# Patient Record
Sex: MALE | Race: WHITE | Employment: OTHER | URBAN - METROPOLITAN AREA
[De-identification: names, ages, dates, MRNs, and addresses within clinical notes are randomized per-mention and may not be internally consistent; named-entity substitution may affect disease eponyms.]

---

## 2018-12-05 ENCOUNTER — TRANSFERRED RECORDS (OUTPATIENT)
Dept: HEALTH INFORMATION MANAGEMENT | Facility: CLINIC | Age: 83
End: 2018-12-05

## 2018-12-10 ENCOUNTER — TRANSFERRED RECORDS (OUTPATIENT)
Dept: HEALTH INFORMATION MANAGEMENT | Facility: CLINIC | Age: 83
End: 2018-12-10

## 2018-12-13 ENCOUNTER — TELEPHONE (OUTPATIENT)
Dept: SURGERY | Facility: CLINIC | Age: 83
End: 2018-12-13

## 2018-12-13 ENCOUNTER — TRANSFERRED RECORDS (OUTPATIENT)
Dept: HEALTH INFORMATION MANAGEMENT | Facility: CLINIC | Age: 83
End: 2018-12-13

## 2018-12-13 NOTE — TELEPHONE ENCOUNTER
Request for records, images, pathology sent to [facility] 12/13/2018 4:12 PM     ONCOLOGY INTAKE: Records Information      APPT INFORMATION:  Referring provider:  Self, 2nd Opinion  Referring provider s clinic:  Seen only at [facility], per Daughter  Reason for visit/diagnosis:  Esophageal Cancer

## 2018-12-14 ENCOUNTER — TRANSFERRED RECORDS (OUTPATIENT)
Dept: HEALTH INFORMATION MANAGEMENT | Facility: CLINIC | Age: 83
End: 2018-12-14

## 2018-12-17 NOTE — PROGRESS NOTES
THORACIC SURGERY - NEW PATIENT OFFICE VISIT      Dear Dr. Gold,    I saw Mr. Jones at Dr. Sharma s request in consultation for the evaluation and treatment of an esophageal cancer.     HPI  Mr. Truong Jones is a 84 year old man who initially presented to the ER at [facility] on 12/17/18 with fatigue and difficulty swallowing. He had a thirty pound weight loss and difficulty swallowing for several months.  At baseline, he has difficulty chewing food due to loss of teeth.  He underwent an EGD on 12/10/18 that showed a partially obstruction mass with biopsies revealing a moderately differentiated invasive adenocarcinoma. A CT chest was performed the following day showing no obvious metastatic disease. An additional EGD/EUS on 12/13/18 that showed the mass at the GE junction. At that time, the esophagus was dilated, which improved his swallowing symptoms. He is a clinical stage T3N0, IIB. Since the dilation and diagnosis, the patient's family has been very involved and encouraging increased caloric intake.  His appetite was decreased after his wife's death.  He has gained several pounds even within the last week.  He is here with his daughter and granddaughter for a second opinion.  The current plan is for chemoradiation pending a PET scan in two days.      Today,  the patient feels improved overall. He has been eating a mechanical soft diet without difficulty. The dilation of esophagus during his last EGD improved his swallowing, though he is having some mild dysphagia.  Mr Jones continues to be active andwalks several times a day with a cane as baseline. He is able to climb two flights of stairs without stopping.  He smoked one pack per day for 30 years and quit 40 years ago.       Previsit Tests   EGD 12/10/18: partially obstructing, likely malignant esophageal tumor in lower third of esophagus, biopsies. Dilated to 12 mm.   Biopsies from EGD 12/10: moderately differentiated invasive  adenocarcinoma  CT chest/abd/pelvis 12/11/18: distal esophageal mass, no adenopathy or evidence of metastatic disease  Endoscopic ultrasound 12/13/18: partially obstructing friable mass encountered at GE junction, traversed with mild resistance. Mass was at least 4 cm in length and 2 cm in thickness sonographically with extensive invovlement beyond the esophageal wall. No evidence of malignant node or distant metastasis. Staged T3N0, IIB.      PMH  Type 2 diabetes mellitus - metformin   HTN - amlodipine, lisinopril, metoprolol   Polymyalgia rheumatica  Hx of prostate cancer  Depression  Thoracic aortic aneurysm    Past surgeries  Radical prostatectomy 6/5/2007    ETOH - no  TOB - 30 year smoking history, quit 40 years ago     Physical examination  BMI   No lymphadenopathy palpated during examination. No nodules are masses identified. Lungs clear to ausculation. Cardiac regular rate and rhythm.     From a personal perspective, the patient is here with his daughter and granddaughter, with whom he lives.  He is a .  He is a retired violinist.        IMPRESSION (C15.9) Esophageal cancer, stage IIB (H)  This is an 84 year old man with stage IIB esophageal adenocarcinoma.        PLAN  I spent a total of 60 minutes with Mr. Jones and his family, more than 50% of which were spent in counseling, coordination of care, and face-to-face time.   I had a long discussion with the patient and his family regarding management of this condition.  Presuming the PET scan shows no distant disease, I recommend resection 6-8 weeks after completing chemoradiation.  If the patient has difficuly swallowing, I discussed consideration of a feeding jejunostomy placement.    I discussed with the patient the conduct of the operation of a transhiatal esophagectomy. I explained that the hospital stay would likely be 5-10 days if there are no complications. I explained that approximately 50% of patients have some complication, ranging from  minor complications to the very serious ones. The recovery takes up to 8 weeks.  I explained to the patient the risks and benefits of the procedure. The benefit of the procedure is to remove the esophageal cancer. The risks include anastomotic leak, gastric necrosis, pneumonia, DVT and pulmonary embolism, arrhythmia, urinary tract infection, jejunostomy tube complications including intestinal obstruction, pyloroplasty leak and death.  They had all their questions answered and were in agreement with the plan.  I appreciate the opportunity to participate in the care of your patient and will keep you updated.  Sincerely,

## 2018-12-18 ENCOUNTER — OFFICE VISIT (OUTPATIENT)
Dept: SURGERY | Facility: CLINIC | Age: 83
End: 2018-12-18
Attending: THORACIC SURGERY (CARDIOTHORACIC VASCULAR SURGERY)
Payer: COMMERCIAL

## 2018-12-18 VITALS
DIASTOLIC BLOOD PRESSURE: 76 MMHG | OXYGEN SATURATION: 97 % | HEIGHT: 63 IN | SYSTOLIC BLOOD PRESSURE: 123 MMHG | BODY MASS INDEX: 25.2 KG/M2 | TEMPERATURE: 98.5 F | WEIGHT: 142.2 LBS | HEART RATE: 90 BPM

## 2018-12-18 DIAGNOSIS — C15.9: ICD-10-CM

## 2018-12-18 PROCEDURE — 99205 OFFICE O/P NEW HI 60 MIN: CPT | Mod: ZP | Performed by: THORACIC SURGERY (CARDIOTHORACIC VASCULAR SURGERY)

## 2018-12-18 PROCEDURE — 40000114 ZZH STATISTIC NO CHARGE CLINIC VISIT

## 2018-12-18 RX ORDER — ATORVASTATIN CALCIUM 20 MG/1
TABLET, FILM COATED ORAL
Status: ON HOLD | COMMUNITY
Start: 2018-09-11 | End: 2019-02-17

## 2018-12-18 RX ORDER — ESCITALOPRAM OXALATE 20 MG/1
10 TABLET ORAL DAILY
COMMUNITY
Start: 2018-12-07

## 2018-12-18 RX ORDER — AMPICILLIN TRIHYDRATE 500 MG
1000 CAPSULE ORAL
Status: ON HOLD | COMMUNITY
Start: 2013-07-24 | End: 2019-02-17

## 2018-12-18 RX ORDER — LISINOPRIL 20 MG/1
20 TABLET ORAL DAILY
COMMUNITY
Start: 2018-11-11

## 2018-12-18 RX ORDER — AMLODIPINE BESYLATE 2.5 MG/1
2.5 TABLET ORAL DAILY
COMMUNITY
Start: 2018-11-11

## 2018-12-18 RX ORDER — MULTIVITAMIN
TABLET ORAL
Status: ON HOLD | COMMUNITY
Start: 2004-08-25 | End: 2019-02-17

## 2018-12-18 RX ORDER — METOPROLOL SUCCINATE 25 MG/1
12.5 TABLET, EXTENDED RELEASE ORAL DAILY
Status: ON HOLD | COMMUNITY
Start: 2018-02-28 | End: 2019-02-17

## 2018-12-18 RX ORDER — DIPHENOXYLATE HCL/ATROPINE 2.5-.025MG
TABLET ORAL
Status: ON HOLD | COMMUNITY
Start: 2018-11-20 | End: 2019-02-17

## 2018-12-18 RX ORDER — FLUOCINONIDE 0.5 MG/G
OINTMENT TOPICAL
Status: ON HOLD | COMMUNITY
Start: 2016-07-08 | End: 2019-02-17

## 2018-12-18 SDOH — HEALTH STABILITY: MENTAL HEALTH: HOW OFTEN DO YOU HAVE A DRINK CONTAINING ALCOHOL?: NEVER

## 2018-12-18 ASSESSMENT — MIFFLIN-ST. JEOR: SCORE: 1226.88

## 2018-12-18 NOTE — LETTER
12/18/2018       RE: Truong Jones  [address]  [place] [address]     Dear Colleague,    Thank you for referring your patient, rTuong Jones, to the [facility]. Please see a copy of my visit note below.    THORACIC SURGERY - NEW PATIENT OFFICE VISIT      Dear Dr. Gold,    I saw Mr. Jones at Dr. Shamra s request in consultation for the evaluation and treatment of an esophageal cancer.     HPI  Mr. Truong Jones is a 84 year old man who initially presented to the ER at [facility] on 12/17/18 with fatigue and difficulty swallowing. He had a thirty pound weight loss and difficulty swallowing for several months.  At baseline, he has difficulty chewing food due to loss of teeth.  He underwent an EGD on 12/10/18 that showed a partially obstruction mass with biopsies revealing a moderately differentiated invasive adenocarcinoma. A CT chest was performed the following day showing no obvious metastatic disease. An additional EGD/EUS on 12/13/18 that showed the mass at the GE junction. At that time, the esophagus was dilated, which improved his swallowing symptoms. He is a clinical stage T3N0, IIB. Since the dilation and diagnosis, the patient's family has been very involved and encouraging increased caloric intake.  His appetite was decreased after his wife's death.  He has gained several pounds even within the last week.  He is here with his daughter and granddaughter for a second opinion.  The current plan is for chemoradiation pending a PET scan in two days.      Today,  the patient feels improved overall. He has been eating a mechanical soft diet without difficulty. The dilation of esophagus during his last EGD improved his swallowing, though he is having some mild dysphagia.  Mr Jones continues to be active andwalks several times a day with a cane as baseline. He is able to climb two flights of stairs without stopping.  He smoked one pack per day for 30 years  and quit 40 years ago.       Previsit Tests   EGD 12/10/18: partially obstructing, likely malignant esophageal tumor in lower third of esophagus, biopsies. Dilated to 12 mm.   Biopsies from EGD 12/10: moderately differentiated invasive adenocarcinoma  CT chest/abd/pelvis 12/11/18: distal esophageal mass, no adenopathy or evidence of metastatic disease  Endoscopic ultrasound 12/13/18: partially obstructing friable mass encountered at GE junction, traversed with mild resistance. Mass was at least 4 cm in length and 2 cm in thickness sonographically with extensive invovlement beyond the esophageal wall. No evidence of malignant node or distant metastasis. Staged T3N0, IIB.      PMH  Type 2 diabetes mellitus - metformin   HTN - amlodipine, lisinopril, metoprolol   Polymyalgia rheumatica  Hx of prostate cancer  Depression  Thoracic aortic aneurysm    Past surgeries  Radical prostatectomy 6/5/2007    ETOH - no  TOB - 30 year smoking history, quit 40 years ago     Physical examination  BMI   No lymphadenopathy palpated during examination. No nodules are masses identified. Lungs clear to ausculation. Cardiac regular rate and rhythm.     From a personal perspective, the patient is here with his daughter and granddaughter, with whom he lives.  He is a .  He is a retired violinist.        IMPRESSION (C15.9) Esophageal cancer, stage IIB (H)  This is an 84 year old man with stage IIB esophageal adenocarcinoma.        PLAN  I spent a total of 60 minutes with Mr. Jones and his family, more than 50% of which were spent in counseling, coordination of care, and face-to-face time.   I had a long discussion with the patient and his family regarding management of this condition.  Presuming the PET scan shows no distant disease, I recommend resection 6-8 weeks after completing chemoradiation.  If the patient has difficuly swallowing, I discussed consideration of a feeding jejunostomy placement.    I discussed with the patient  the conduct of the operation of a transhiatal esophagectomy. I explained that the hospital stay would likely be 5-10 days if there are no complications. I explained that approximately 50% of patients have some complication, ranging from minor complications to the very serious ones. The recovery takes up to 8 weeks.  I explained to the patient the risks and benefits of the procedure. The benefit of the procedure is to remove the esophageal cancer. The risks include anastomotic leak, gastric necrosis, pneumonia, DVT and pulmonary embolism, arrhythmia, urinary tract infection, jejunostomy tube complications including intestinal obstruction, pyloroplasty leak and death.  They had all their questions answered and were in agreement with the plan.  I appreciate the opportunity to participate in the care of your patient and will keep you updated.    Sincerely,    Again, thank you for allowing me to participate in the care of your patient.      Sincerely,    Jaime Szymanski MD

## 2018-12-19 PROCEDURE — 00000346 ZZHCL STATISTIC REVIEW OUTSIDE SLIDES TC 88321: Performed by: THORACIC SURGERY (CARDIOTHORACIC VASCULAR SURGERY)

## 2018-12-20 LAB — COPATH REPORT: NORMAL

## 2018-12-21 ENCOUNTER — TELEPHONE (OUTPATIENT)
Dept: SURGERY | Facility: CLINIC | Age: 83
End: 2018-12-21

## 2018-12-21 NOTE — TELEPHONE ENCOUNTER
Antonio was called to see if he has decided if he is having treatment at [facility] or the [facility].  He has elected to have chemo/rads at [facility] and will begin after the New Year.  He would like to have surgery if need with Dr. Szymanski after treatment.    I informed him that we will touch base with him in a month or so to arrange a f/u apt with Dr. Szymanski.

## 2018-12-25 PROBLEM — M35.3 POLYMYALGIA RHEUMATICA (H): Status: ACTIVE | Noted: 2018-12-25

## 2018-12-25 PROBLEM — I71.20 THORACIC AORTIC ANEURYSM WITHOUT RUPTURE (H): Status: ACTIVE | Noted: 2018-12-25

## 2018-12-25 PROBLEM — Z85.46 PERSONAL HISTORY OF PROSTATE CANCER: Status: ACTIVE | Noted: 2018-12-25

## 2018-12-25 PROBLEM — I10 ESSENTIAL HYPERTENSION: Status: ACTIVE | Noted: 2018-12-25

## 2018-12-25 PROBLEM — F32.A DEPRESSION: Status: ACTIVE | Noted: 2018-12-25

## 2018-12-25 PROBLEM — C15.9: Status: ACTIVE | Noted: 2018-12-25

## 2018-12-25 PROBLEM — E11.9 TYPE 2 DIABETES MELLITUS (H): Status: ACTIVE | Noted: 2018-12-25

## 2019-01-23 ENCOUNTER — TELEPHONE (OUTPATIENT)
Dept: SURGERY | Facility: CLINIC | Age: 84
End: 2019-01-23

## 2019-01-24 NOTE — TELEPHONE ENCOUNTER
Received a message from Rebel(radiation nurse from [facility])stating that pt is in need of a feeding tube.  He is scheduled for a GJ tube at [facility] and she was concerned that Dr. Szymanski would not want that done. Pt possibly will need esophageal resection post C/R   I spoke with Dr. Ryder and he thought Dr. Szymanski would want to place a Jtube.  We will discuss with Dr. Szymanski tomorrow  I returned a call to [facility] but no one was available after 4pm.  Will update them tomorrow

## 2019-01-25 ENCOUNTER — TELEPHONE (OUTPATIENT)
Dept: SURGERY | Facility: CLINIC | Age: 84
End: 2019-01-25

## 2019-01-25 ENCOUNTER — HOSPITAL ENCOUNTER (INPATIENT)
Facility: CLINIC | Age: 84
Setting detail: SURGERY ADMIT
End: 2019-01-25
Attending: THORACIC SURGERY (CARDIOTHORACIC VASCULAR SURGERY) | Admitting: THORACIC SURGERY (CARDIOTHORACIC VASCULAR SURGERY)
Payer: COMMERCIAL

## 2019-01-25 DIAGNOSIS — C15.9: Primary | ICD-10-CM

## 2019-01-25 RX ORDER — CEFAZOLIN SODIUM 2 G/50ML
2 SOLUTION INTRAVENOUS
Status: CANCELLED | OUTPATIENT
Start: 2019-01-25

## 2019-01-25 RX ORDER — CEFAZOLIN SODIUM 1 G/50ML
1 INJECTION, SOLUTION INTRAVENOUS SEE ADMIN INSTRUCTIONS
Status: CANCELLED | OUTPATIENT
Start: 2019-01-25

## 2019-01-25 NOTE — TELEPHONE ENCOUNTER
Spoke with patients daughter to schedule surgery with Dr Jaime Szymanski    Surgery was scheduled on 2/1 at [facility]    Patient will have Pre-Op with PCP on 1/28  -Patient advised H&P is needed or surgery cancellation may occur    Patient is aware a / is needed day of surgery.     Surgery packet was sent via mail, patient has my direct contact information for any further questions.     Justine Delarosa  Surgical [facility]onie-Op Coordinator  [phone]

## 2019-01-25 NOTE — TELEPHONE ENCOUNTER
Spoke with both Antonio and his daughter and discussed placement of Jtube.  Dr. Szymanski would like to have it placed next week.  They will work on getting a pre-op physical at [facility].  Our surgery scheduler will call them with date and time and instructions.    I also called and spoke with Rebel radiation nurse at [facility].  She was updated with information     Will try and get pt scheduled for [facility] center appt before surgery next week

## 2019-01-29 ENCOUNTER — TELEPHONE (OUTPATIENT)
Dept: SURGERY | Facility: CLINIC | Age: 84
End: 2019-01-29

## 2019-01-29 NOTE — TELEPHONE ENCOUNTER
I called daughter, Miryam, about her concerns about her father.  I had discussed this with Dr. Szymasnki who suggested we admit him to hydrate and try to move up J-tube placement as he is not able to eat/drink much.       She told me he had collapsed at home yesterday and they had him transported by ambulance to [facility] (he is receiving daily radiation treatments there).   He was admitted through the ED and she was awaiting a conversation with Dr. Quiros (internal medicine) who is rounding and expected by 4pm.   He had indicated to her earlier that he wanted to talk to Dr. Szymanski.   Patient is currently on [facility] South at [facility].  He can tolerate 2 Ensure's each day but otherwise takes little orally.       I gave her Dr. Szymanski's cell phone # and asked her to have Dr. Quiros call him today to discuss further.   She would like for him to come here for J-tube placement and then return to [facility] so he doesn't miss a radiation treatment.   I explained that we sometimes admit patients to assess tolerance to tube feeding formulas but would leave this up to the surgeon.

## 2019-01-29 NOTE — TELEPHONE ENCOUNTER
Patient's granddaughter called to see if this procedure can be done sooner than scheduled date 2/1.  His family feels he is failing and would benefit to have this moved up asa.  Please advise

## 2019-01-31 ENCOUNTER — ANESTHESIA EVENT (OUTPATIENT)
Dept: SURGERY | Facility: CLINIC | Age: 84
End: 2019-01-31

## 2019-02-01 ENCOUNTER — ANESTHESIA (OUTPATIENT)
Dept: SURGERY | Facility: CLINIC | Age: 84
End: 2019-02-01

## 2019-02-01 ENCOUNTER — TELEPHONE (OUTPATIENT)
Dept: SURGERY | Facility: CLINIC | Age: 84
End: 2019-02-01

## 2019-02-01 NOTE — TELEPHONE ENCOUNTER
Call from 3C pre-op nurse who had talked to patients' daughter; she said that Dr. Szymanski spoke with a physician from [facility] and decided that this procedure is not needed because the patient is taking in enough food orally.   I confirmed this with Dr. Szymanski and procedure has been cancelled.

## 2019-02-12 ENCOUNTER — TRANSFERRED RECORDS (OUTPATIENT)
Dept: HEALTH INFORMATION MANAGEMENT | Facility: CLINIC | Age: 84
End: 2019-02-12

## 2019-02-15 ENCOUNTER — HOSPITAL ENCOUNTER (OUTPATIENT)
Facility: CLINIC | Age: 84
End: 2019-02-15
Attending: THORACIC SURGERY (CARDIOTHORACIC VASCULAR SURGERY) | Admitting: THORACIC SURGERY (CARDIOTHORACIC VASCULAR SURGERY)
Payer: MEDICARE

## 2019-02-15 DIAGNOSIS — C15.9: Primary | ICD-10-CM

## 2019-02-15 LAB
CREAT SERPL-MCNC: 0.6 MG/DL (ref 0.73–1.18)
GFR SERPL CREATININE-BSD FRML MDRD: >60 ML/MIN/1.73M2
GLUCOSE SERPL-MCNC: 138 MG/DL (ref 70–180)
POTASSIUM SERPL-SCNC: 3.4 MMOL/L (ref 3.5–5.1)

## 2019-02-15 RX ORDER — CEFAZOLIN SODIUM 2 G/50ML
2 SOLUTION INTRAVENOUS
Status: CANCELLED | OUTPATIENT
Start: 2019-02-15

## 2019-02-15 RX ORDER — CEFAZOLIN SODIUM 1 G/50ML
1 INJECTION, SOLUTION INTRAVENOUS SEE ADMIN INSTRUCTIONS
Status: CANCELLED | OUTPATIENT
Start: 2019-02-15

## 2019-02-16 ENCOUNTER — APPOINTMENT (OUTPATIENT)
Dept: GENERAL RADIOLOGY | Facility: CLINIC | Age: 84
DRG: 374 | End: 2019-02-16
Attending: THORACIC SURGERY (CARDIOTHORACIC VASCULAR SURGERY)
Payer: MEDICARE

## 2019-02-16 ENCOUNTER — TRANSFERRED RECORDS (OUTPATIENT)
Dept: HEALTH INFORMATION MANAGEMENT | Facility: CLINIC | Age: 84
End: 2019-02-16

## 2019-02-16 ENCOUNTER — HOSPITAL ENCOUNTER (INPATIENT)
Facility: CLINIC | Age: 84
LOS: 6 days | Discharge: NURSING, REHAB OR LONG-TERM CARE | DRG: 374 | End: 2019-02-22
Attending: THORACIC SURGERY (CARDIOTHORACIC VASCULAR SURGERY) | Admitting: THORACIC SURGERY (CARDIOTHORACIC VASCULAR SURGERY)
Payer: MEDICARE

## 2019-02-16 DIAGNOSIS — G89.18 ACUTE POST-OPERATIVE PAIN: Primary | ICD-10-CM

## 2019-02-16 DIAGNOSIS — R62.51 FAILURE TO THRIVE (0-17): ICD-10-CM

## 2019-02-16 LAB
ALBUMIN SERPL-MCNC: 2.9 G/DL (ref 3.4–5)
ALP SERPL-CCNC: 76 U/L (ref 40–150)
ALT SERPL W P-5'-P-CCNC: 16 U/L (ref 0–70)
ANION GAP SERPL CALCULATED.3IONS-SCNC: 7 MMOL/L (ref 3–14)
AST SERPL W P-5'-P-CCNC: 22 U/L (ref 0–45)
BILIRUB SERPL-MCNC: 0.4 MG/DL (ref 0.2–1.3)
BUN SERPL-MCNC: 19 MG/DL (ref 7–30)
CALCIUM SERPL-MCNC: 8 MG/DL (ref 8.5–10.1)
CHLORIDE SERPL-SCNC: 106 MMOL/L (ref 94–109)
CO2 SERPL-SCNC: 24 MMOL/L (ref 20–32)
CREAT SERPL-MCNC: 0.59 MG/DL (ref 0.66–1.25)
ERYTHROCYTE [DISTWIDTH] IN BLOOD BY AUTOMATED COUNT: 17 % (ref 10–15)
GFR SERPL CREATININE-BSD FRML MDRD: >90 ML/MIN/{1.73_M2}
GLUCOSE BLDC GLUCOMTR-MCNC: 123 MG/DL (ref 70–99)
GLUCOSE SERPL-MCNC: 121 MG/DL (ref 70–99)
HBA1C MFR BLD: 6.2 % (ref 0–5.6)
HCT VFR BLD AUTO: 27.6 % (ref 40–53)
HGB BLD-MCNC: 9.3 G/DL (ref 13.3–17.7)
INR PPP: 1.17 (ref 0.86–1.14)
LACTATE BLD-SCNC: 1.1 MMOL/L (ref 0.7–2)
MAGNESIUM SERPL-MCNC: 2.2 MG/DL (ref 1.6–2.3)
MCH RBC QN AUTO: 31 PG (ref 26.5–33)
MCHC RBC AUTO-ENTMCNC: 33.7 G/DL (ref 31.5–36.5)
MCV RBC AUTO: 92 FL (ref 78–100)
PHOSPHATE SERPL-MCNC: 3 MG/DL (ref 2.5–4.5)
PLATELET # BLD AUTO: 54 10E9/L (ref 150–450)
POTASSIUM SERPL-SCNC: 3.3 MMOL/L (ref 3.4–5.3)
PROT SERPL-MCNC: 5.9 G/DL (ref 6.8–8.8)
RBC # BLD AUTO: 3 10E12/L (ref 4.4–5.9)
SODIUM SERPL-SCNC: 138 MMOL/L (ref 133–144)
WBC # BLD AUTO: 3.3 10E9/L (ref 4–11)

## 2019-02-16 PROCEDURE — 84100 ASSAY OF PHOSPHORUS: CPT | Performed by: STUDENT IN AN ORGANIZED HEALTH CARE EDUCATION/TRAINING PROGRAM

## 2019-02-16 PROCEDURE — 80053 COMPREHEN METABOLIC PANEL: CPT | Performed by: STUDENT IN AN ORGANIZED HEALTH CARE EDUCATION/TRAINING PROGRAM

## 2019-02-16 PROCEDURE — 85610 PROTHROMBIN TIME: CPT | Performed by: STUDENT IN AN ORGANIZED HEALTH CARE EDUCATION/TRAINING PROGRAM

## 2019-02-16 PROCEDURE — 3E0436Z INTRODUCTION OF NUTRITIONAL SUBSTANCE INTO CENTRAL VEIN, PERCUTANEOUS APPROACH: ICD-10-PCS | Performed by: THORACIC SURGERY (CARDIOTHORACIC VASCULAR SURGERY)

## 2019-02-16 PROCEDURE — 25000125 ZZHC RX 250

## 2019-02-16 PROCEDURE — 40000557 ZZH STATISTIC PORT-A-CATH ACCESS/FLUSHING

## 2019-02-16 PROCEDURE — 84134 ASSAY OF PREALBUMIN: CPT | Performed by: STUDENT IN AN ORGANIZED HEALTH CARE EDUCATION/TRAINING PROGRAM

## 2019-02-16 PROCEDURE — 12000001 ZZH R&B MED SURG/OB UMMC

## 2019-02-16 PROCEDURE — 40000141 ZZH STATISTIC PERIPHERAL IV START W/O US GUIDANCE

## 2019-02-16 PROCEDURE — 83735 ASSAY OF MAGNESIUM: CPT | Performed by: STUDENT IN AN ORGANIZED HEALTH CARE EDUCATION/TRAINING PROGRAM

## 2019-02-16 PROCEDURE — 00000146 ZZHCL STATISTIC GLUCOSE BY METER IP

## 2019-02-16 PROCEDURE — 71045 X-RAY EXAM CHEST 1 VIEW: CPT

## 2019-02-16 PROCEDURE — 25000128 H RX IP 250 OP 636: Performed by: STUDENT IN AN ORGANIZED HEALTH CARE EDUCATION/TRAINING PROGRAM

## 2019-02-16 PROCEDURE — 36415 COLL VENOUS BLD VENIPUNCTURE: CPT | Performed by: STUDENT IN AN ORGANIZED HEALTH CARE EDUCATION/TRAINING PROGRAM

## 2019-02-16 PROCEDURE — 83036 HEMOGLOBIN GLYCOSYLATED A1C: CPT | Performed by: STUDENT IN AN ORGANIZED HEALTH CARE EDUCATION/TRAINING PROGRAM

## 2019-02-16 PROCEDURE — 85027 COMPLETE CBC AUTOMATED: CPT | Performed by: STUDENT IN AN ORGANIZED HEALTH CARE EDUCATION/TRAINING PROGRAM

## 2019-02-16 PROCEDURE — 83605 ASSAY OF LACTIC ACID: CPT | Performed by: THORACIC SURGERY (CARDIOTHORACIC VASCULAR SURGERY)

## 2019-02-16 RX ORDER — HEPARIN SODIUM 5000 [USP'U]/.5ML
5000 INJECTION, SOLUTION INTRAVENOUS; SUBCUTANEOUS EVERY 8 HOURS
Status: DISCONTINUED | OUTPATIENT
Start: 2019-02-16 | End: 2019-02-17

## 2019-02-16 RX ORDER — PROCHLORPERAZINE MALEATE 5 MG
5 TABLET ORAL EVERY 6 HOURS PRN
Status: DISCONTINUED | OUTPATIENT
Start: 2019-02-16 | End: 2019-02-22 | Stop reason: HOSPADM

## 2019-02-16 RX ORDER — ONDANSETRON 4 MG/1
4 TABLET, ORALLY DISINTEGRATING ORAL EVERY 6 HOURS PRN
Status: DISCONTINUED | OUTPATIENT
Start: 2019-02-16 | End: 2019-02-22 | Stop reason: HOSPADM

## 2019-02-16 RX ORDER — OXYCODONE HYDROCHLORIDE 5 MG/1
5 TABLET ORAL
Status: DISCONTINUED | OUTPATIENT
Start: 2019-02-16 | End: 2019-02-18

## 2019-02-16 RX ORDER — NICOTINE POLACRILEX 4 MG
15-30 LOZENGE BUCCAL
Status: DISCONTINUED | OUTPATIENT
Start: 2019-02-16 | End: 2019-02-22 | Stop reason: HOSPADM

## 2019-02-16 RX ORDER — PROCHLORPERAZINE 25 MG
12.5 SUPPOSITORY, RECTAL RECTAL EVERY 12 HOURS PRN
Status: DISCONTINUED | OUTPATIENT
Start: 2019-02-16 | End: 2019-02-22 | Stop reason: HOSPADM

## 2019-02-16 RX ORDER — AMOXICILLIN 250 MG
1-2 CAPSULE ORAL 2 TIMES DAILY
Status: DISCONTINUED | OUTPATIENT
Start: 2019-02-16 | End: 2019-02-22 | Stop reason: HOSPADM

## 2019-02-16 RX ORDER — POLYETHYLENE GLYCOL 3350 17 G/17G
17 POWDER, FOR SOLUTION ORAL DAILY PRN
Status: DISCONTINUED | OUTPATIENT
Start: 2019-02-16 | End: 2019-02-17

## 2019-02-16 RX ORDER — DEXTROSE MONOHYDRATE 25 G/50ML
25-50 INJECTION, SOLUTION INTRAVENOUS
Status: DISCONTINUED | OUTPATIENT
Start: 2019-02-16 | End: 2019-02-22 | Stop reason: HOSPADM

## 2019-02-16 RX ORDER — ACETAMINOPHEN 325 MG/1
650 TABLET ORAL EVERY 4 HOURS PRN
Status: DISCONTINUED | OUTPATIENT
Start: 2019-02-16 | End: 2019-02-17

## 2019-02-16 RX ORDER — ONDANSETRON 2 MG/ML
4 INJECTION INTRAMUSCULAR; INTRAVENOUS EVERY 6 HOURS PRN
Status: DISCONTINUED | OUTPATIENT
Start: 2019-02-16 | End: 2019-02-22 | Stop reason: HOSPADM

## 2019-02-16 RX ORDER — CYCLOBENZAPRINE HCL 5 MG
5 TABLET ORAL 3 TIMES DAILY PRN
Status: DISCONTINUED | OUTPATIENT
Start: 2019-02-16 | End: 2019-02-22 | Stop reason: HOSPADM

## 2019-02-16 RX ORDER — NALOXONE HYDROCHLORIDE 0.4 MG/ML
.1-.4 INJECTION, SOLUTION INTRAMUSCULAR; INTRAVENOUS; SUBCUTANEOUS
Status: DISCONTINUED | OUTPATIENT
Start: 2019-02-16 | End: 2019-02-22 | Stop reason: HOSPADM

## 2019-02-16 RX ADMIN — I.V. FAT EMULSION 250 ML: 20 EMULSION INTRAVENOUS at 22:58

## 2019-02-16 RX ADMIN — Medication 5000 UNITS: at 22:05

## 2019-02-16 RX ADMIN — FAMOTIDINE 20 MG: 20 INJECTION, SOLUTION INTRAVENOUS at 22:59

## 2019-02-16 RX ADMIN — ASCORBIC ACID, VITAMIN A PALMITATE, CHOLECALCIFEROL, THIAMINE HYDROCHLORIDE, RIBOFLAVIN-5 PHOSPHATE SODIUM, PYRIDOXINE HYDROCHLORIDE, NIACINAMIDE, DEXPANTHENOL, ALPHA-TOCOPHEROL ACETATE, VITAMIN K1, FOLIC ACID, BIOTIN, CYANOCOBALAMIN: 200; 3300; 200; 6; 3.6; 6; 40; 15; 10; 150; 600; 60; 5 INJECTION, SOLUTION INTRAVENOUS at 22:59

## 2019-02-16 ASSESSMENT — ACTIVITIES OF DAILY LIVING (ADL): ADLS_ACUITY_SCORE: 18

## 2019-02-16 NOTE — LETTER
Transition Communication Hand-off for Care Transitions to Next Level of Care Provider    Name: Truong Jones  : 3/28/1934  MRN #: [id number]  Primary Care Provider: David Gold     Primary Clinic: [address]   [facility] [place] [address]     Reason for Hospitalization:  Adnocarcinoma  e  Failure to thrive (0-17)  Admit Date/Time: 2019  4:59 PM  Discharge Date: 19  Payor Source: Payor: [employer] / Plan: [employer] PLAN / Product Type: HMO /     Readmission Assessment Measure (MANSI) Risk Score/category: Average         Reason for Communication Hand-off Referral: Other D/c to TCU    Discharge Plan:      Patient Name:  Truong Jones     Anticipated Discharge Date:  19    Discharge Disposition:   TCU:  [facility]   [address] [place] [address]  P. [phone], F. [phone]     Concern for non-adherence with plan of care:   Y/N Unknown  Discharge Needs Assessment:  Needs      Most Recent Value   Equipment Currently Used at Home  cane, straight, cane, quad [pt reports using straight cane more frequently]          Already enrolled in Tele-monitoring program and name of program:  Unknown  Follow-up specialty is recommended: Unknown    Follow-up plan:  No future appointments.    Any outstanding tests or procedures:        Referrals     Future Labs/Procedures    Occupational Therapy Adult Consult     Comments:    Evaluate and treat as clinically indicated.    Reason:  S/p thoracic surgery, deconditioning    Physical Therapy Adult Consult     Comments:    Evaluate and treat as clinically indicated.    Reason:  S/p thoracic surgery, deconditioning            LIDIA Franklin, MSW  7B   [phone] (pager) [phone]  2019

## 2019-02-17 ENCOUNTER — ANESTHESIA EVENT (OUTPATIENT)
Dept: SURGERY | Facility: CLINIC | Age: 84
DRG: 374 | End: 2019-02-17
Payer: MEDICARE

## 2019-02-17 ENCOUNTER — APPOINTMENT (OUTPATIENT)
Dept: SPEECH THERAPY | Facility: CLINIC | Age: 84
DRG: 374 | End: 2019-02-17
Attending: THORACIC SURGERY (CARDIOTHORACIC VASCULAR SURGERY)
Payer: MEDICARE

## 2019-02-17 LAB
ALBUMIN SERPL-MCNC: 2.7 G/DL (ref 3.4–5)
ALP SERPL-CCNC: 70 U/L (ref 40–150)
ALT SERPL W P-5'-P-CCNC: 15 U/L (ref 0–70)
ANION GAP SERPL CALCULATED.3IONS-SCNC: 4 MMOL/L (ref 3–14)
AST SERPL W P-5'-P-CCNC: 20 U/L (ref 0–45)
BILIRUB DIRECT SERPL-MCNC: 0.1 MG/DL (ref 0–0.2)
BILIRUB SERPL-MCNC: 0.5 MG/DL (ref 0.2–1.3)
BUN SERPL-MCNC: 16 MG/DL (ref 7–30)
CALCIUM SERPL-MCNC: 7.9 MG/DL (ref 8.5–10.1)
CHLORIDE SERPL-SCNC: 107 MMOL/L (ref 94–109)
CO2 SERPL-SCNC: 27 MMOL/L (ref 20–32)
CREAT SERPL-MCNC: 0.61 MG/DL (ref 0.66–1.25)
GFR SERPL CREATININE-BSD FRML MDRD: >90 ML/MIN/{1.73_M2}
GLUCOSE BLDC GLUCOMTR-MCNC: 129 MG/DL (ref 70–99)
GLUCOSE BLDC GLUCOMTR-MCNC: 131 MG/DL (ref 70–99)
GLUCOSE BLDC GLUCOMTR-MCNC: 144 MG/DL (ref 70–99)
GLUCOSE BLDC GLUCOMTR-MCNC: 146 MG/DL (ref 70–99)
GLUCOSE BLDC GLUCOMTR-MCNC: 150 MG/DL (ref 70–99)
GLUCOSE BLDC GLUCOMTR-MCNC: 151 MG/DL (ref 70–99)
GLUCOSE BLDC GLUCOMTR-MCNC: 159 MG/DL (ref 70–99)
GLUCOSE SERPL-MCNC: 138 MG/DL (ref 70–99)
INR PPP: 1.19 (ref 0.86–1.14)
MAGNESIUM SERPL-MCNC: 2.1 MG/DL (ref 1.6–2.3)
PHOSPHATE SERPL-MCNC: 3.3 MG/DL (ref 2.5–4.5)
POTASSIUM SERPL-SCNC: 3.5 MMOL/L (ref 3.4–5.3)
PROT SERPL-MCNC: 5.4 G/DL (ref 6.8–8.8)
SODIUM SERPL-SCNC: 138 MMOL/L (ref 133–144)

## 2019-02-17 PROCEDURE — 12000001 ZZH R&B MED SURG/OB UMMC

## 2019-02-17 PROCEDURE — 25000132 ZZH RX MED GY IP 250 OP 250 PS 637: Mod: GY | Performed by: STUDENT IN AN ORGANIZED HEALTH CARE EDUCATION/TRAINING PROGRAM

## 2019-02-17 PROCEDURE — 25800030 ZZH RX IP 258 OP 636: Performed by: STUDENT IN AN ORGANIZED HEALTH CARE EDUCATION/TRAINING PROGRAM

## 2019-02-17 PROCEDURE — 25000125 ZZHC RX 250: Performed by: THORACIC SURGERY (CARDIOTHORACIC VASCULAR SURGERY)

## 2019-02-17 PROCEDURE — A9270 NON-COVERED ITEM OR SERVICE: HCPCS | Mod: GY | Performed by: STUDENT IN AN ORGANIZED HEALTH CARE EDUCATION/TRAINING PROGRAM

## 2019-02-17 PROCEDURE — 40000225 ZZH STATISTIC SLP WARD VISIT

## 2019-02-17 PROCEDURE — 82248 BILIRUBIN DIRECT: CPT | Performed by: THORACIC SURGERY (CARDIOTHORACIC VASCULAR SURGERY)

## 2019-02-17 PROCEDURE — 84100 ASSAY OF PHOSPHORUS: CPT | Performed by: THORACIC SURGERY (CARDIOTHORACIC VASCULAR SURGERY)

## 2019-02-17 PROCEDURE — 36415 COLL VENOUS BLD VENIPUNCTURE: CPT | Performed by: THORACIC SURGERY (CARDIOTHORACIC VASCULAR SURGERY)

## 2019-02-17 PROCEDURE — 25000131 ZZH RX MED GY IP 250 OP 636 PS 637: Mod: GY | Performed by: STUDENT IN AN ORGANIZED HEALTH CARE EDUCATION/TRAINING PROGRAM

## 2019-02-17 PROCEDURE — 85610 PROTHROMBIN TIME: CPT | Performed by: THORACIC SURGERY (CARDIOTHORACIC VASCULAR SURGERY)

## 2019-02-17 PROCEDURE — 80053 COMPREHEN METABOLIC PANEL: CPT | Performed by: THORACIC SURGERY (CARDIOTHORACIC VASCULAR SURGERY)

## 2019-02-17 PROCEDURE — 25000128 H RX IP 250 OP 636: Performed by: STUDENT IN AN ORGANIZED HEALTH CARE EDUCATION/TRAINING PROGRAM

## 2019-02-17 PROCEDURE — 92610 EVALUATE SWALLOWING FUNCTION: CPT | Mod: GN

## 2019-02-17 PROCEDURE — 83735 ASSAY OF MAGNESIUM: CPT | Performed by: THORACIC SURGERY (CARDIOTHORACIC VASCULAR SURGERY)

## 2019-02-17 PROCEDURE — 00000146 ZZHCL STATISTIC GLUCOSE BY METER IP

## 2019-02-17 RX ORDER — POLYETHYLENE GLYCOL 3350 17 G/17G
17 POWDER, FOR SOLUTION ORAL DAILY
COMMUNITY
Start: 2019-02-17

## 2019-02-17 RX ORDER — ACETAMINOPHEN 160 MG/5ML
327-650 LIQUID ORAL EVERY 6 HOURS PRN
COMMUNITY
Start: 2019-01-31

## 2019-02-17 RX ORDER — SENNA LEAF EXTRACT 176MG/5ML
5 SYRUP ORAL DAILY
COMMUNITY
Start: 2019-02-17

## 2019-02-17 RX ORDER — OXYCODONE HCL 5 MG/5 ML
5-7.5 SOLUTION, ORAL ORAL EVERY 4 HOURS PRN
Status: ON HOLD | COMMUNITY
Start: 2019-02-16 | End: 2019-02-22

## 2019-02-17 RX ORDER — POLYETHYLENE GLYCOL 3350 17 G/17G
17 POWDER, FOR SOLUTION ORAL DAILY
Status: DISCONTINUED | OUTPATIENT
Start: 2019-02-17 | End: 2019-02-17

## 2019-02-17 RX ORDER — POTASSIUM CHLORIDE 750 MG/1
20-40 TABLET, EXTENDED RELEASE ORAL
Status: DISCONTINUED | OUTPATIENT
Start: 2019-02-17 | End: 2019-02-22 | Stop reason: HOSPADM

## 2019-02-17 RX ORDER — POTASSIUM CL/LIDO/0.9 % NACL 10MEQ/0.1L
10 INTRAVENOUS SOLUTION, PIGGYBACK (ML) INTRAVENOUS
Status: DISCONTINUED | OUTPATIENT
Start: 2019-02-17 | End: 2019-02-22 | Stop reason: HOSPADM

## 2019-02-17 RX ORDER — POTASSIUM CHLORIDE 29.8 MG/ML
20 INJECTION INTRAVENOUS
Status: DISCONTINUED | OUTPATIENT
Start: 2019-02-17 | End: 2019-02-22 | Stop reason: HOSPADM

## 2019-02-17 RX ORDER — LANOLIN ALCOHOL/MO/W.PET/CERES
3 CREAM (GRAM) TOPICAL ONCE
Status: COMPLETED | OUTPATIENT
Start: 2019-02-17 | End: 2019-02-17

## 2019-02-17 RX ORDER — PROCHLORPERAZINE MALEATE 10 MG
10 TABLET ORAL EVERY 6 HOURS PRN
COMMUNITY
Start: 2019-01-04

## 2019-02-17 RX ORDER — SODIUM CHLORIDE, SODIUM LACTATE, POTASSIUM CHLORIDE, CALCIUM CHLORIDE 600; 310; 30; 20 MG/100ML; MG/100ML; MG/100ML; MG/100ML
INJECTION, SOLUTION INTRAVENOUS CONTINUOUS
Status: DISCONTINUED | OUTPATIENT
Start: 2019-02-17 | End: 2019-02-22 | Stop reason: HOSPADM

## 2019-02-17 RX ORDER — SUCRALFATE ORAL 1 G/10ML
1 SUSPENSION ORAL 3 TIMES DAILY
COMMUNITY
Start: 2019-01-31

## 2019-02-17 RX ORDER — POLYETHYLENE GLYCOL 3350 17 G/17G
17 POWDER, FOR SOLUTION ORAL DAILY
Status: DISCONTINUED | OUTPATIENT
Start: 2019-02-17 | End: 2019-02-18

## 2019-02-17 RX ORDER — POTASSIUM CHLORIDE 1.5 G/1.58G
20-40 POWDER, FOR SOLUTION ORAL
Status: DISCONTINUED | OUTPATIENT
Start: 2019-02-17 | End: 2019-02-22 | Stop reason: HOSPADM

## 2019-02-17 RX ORDER — DIPHENHYDRAMINE HYDROCHLORIDE AND LIDOCAINE HYDROCHLORIDE AND ALUMINUM HYDROXIDE AND MAGNESIUM HYDRO
5 KIT
COMMUNITY
Start: 2019-01-31

## 2019-02-17 RX ORDER — AMLODIPINE BESYLATE 2.5 MG/1
2.5 TABLET ORAL DAILY
Status: DISCONTINUED | OUTPATIENT
Start: 2019-02-17 | End: 2019-02-22 | Stop reason: HOSPADM

## 2019-02-17 RX ORDER — POTASSIUM CHLORIDE 7.45 MG/ML
10 INJECTION INTRAVENOUS
Status: DISCONTINUED | OUTPATIENT
Start: 2019-02-17 | End: 2019-02-22 | Stop reason: HOSPADM

## 2019-02-17 RX ORDER — ESCITALOPRAM OXALATE 10 MG/1
20 TABLET ORAL DAILY
Status: DISCONTINUED | OUTPATIENT
Start: 2019-02-17 | End: 2019-02-22 | Stop reason: HOSPADM

## 2019-02-17 RX ORDER — BACLOFEN 10 MG/1
10 TABLET ORAL 3 TIMES DAILY PRN
COMMUNITY
Start: 2019-02-16 | End: 2020-02-16

## 2019-02-17 RX ADMIN — ONDANSETRON 4 MG: 2 INJECTION INTRAMUSCULAR; INTRAVENOUS at 15:30

## 2019-02-17 RX ADMIN — ONDANSETRON 4 MG: 2 INJECTION INTRAMUSCULAR; INTRAVENOUS at 08:30

## 2019-02-17 RX ADMIN — Medication 3 MG: at 22:17

## 2019-02-17 RX ADMIN — SENNOSIDES AND DOCUSATE SODIUM 2 TABLET: 8.6; 5 TABLET ORAL at 08:32

## 2019-02-17 RX ADMIN — INSULIN ASPART 1 UNITS: 100 INJECTION, SOLUTION INTRAVENOUS; SUBCUTANEOUS at 20:33

## 2019-02-17 RX ADMIN — FAMOTIDINE 20 MG: 20 INJECTION, SOLUTION INTRAVENOUS at 20:34

## 2019-02-17 RX ADMIN — POLYETHYLENE GLYCOL 3350 17 G: 17 POWDER, FOR SOLUTION ORAL at 13:05

## 2019-02-17 RX ADMIN — AMLODIPINE BESYLATE 2.5 MG: 2.5 TABLET ORAL at 15:22

## 2019-02-17 RX ADMIN — FAMOTIDINE 20 MG: 20 INJECTION, SOLUTION INTRAVENOUS at 08:22

## 2019-02-17 RX ADMIN — INSULIN ASPART 1 UNITS: 100 INJECTION, SOLUTION INTRAVENOUS; SUBCUTANEOUS at 16:00

## 2019-02-17 RX ADMIN — SENNOSIDES AND DOCUSATE SODIUM 1 TABLET: 8.6; 5 TABLET ORAL at 20:36

## 2019-02-17 RX ADMIN — INSULIN ASPART 1 UNITS: 100 INJECTION, SOLUTION INTRAVENOUS; SUBCUTANEOUS at 23:15

## 2019-02-17 RX ADMIN — INSULIN ASPART 1 UNITS: 100 INJECTION, SOLUTION INTRAVENOUS; SUBCUTANEOUS at 13:09

## 2019-02-17 RX ADMIN — INSULIN ASPART 1 UNITS: 100 INJECTION, SOLUTION INTRAVENOUS; SUBCUTANEOUS at 09:54

## 2019-02-17 RX ADMIN — SODIUM CHLORIDE, POTASSIUM CHLORIDE, SODIUM LACTATE AND CALCIUM CHLORIDE: 600; 310; 30; 20 INJECTION, SOLUTION INTRAVENOUS at 13:05

## 2019-02-17 RX ADMIN — Medication 5000 UNITS: at 06:12

## 2019-02-17 RX ADMIN — ESCITALOPRAM OXALATE 20 MG: 10 TABLET ORAL at 15:22

## 2019-02-17 RX ADMIN — ACETAMINOPHEN 650 MG: 325 SOLUTION ORAL at 18:50

## 2019-02-17 RX ADMIN — POTASSIUM CHLORIDE: 2 INJECTION, SOLUTION, CONCENTRATE INTRAVENOUS at 20:36

## 2019-02-17 RX ADMIN — PROCHLORPERAZINE EDISYLATE 5 MG: 5 INJECTION INTRAMUSCULAR; INTRAVENOUS at 10:13

## 2019-02-17 ASSESSMENT — ACTIVITIES OF DAILY LIVING (ADL)
ADLS_ACUITY_SCORE: 18

## 2019-02-17 NOTE — PLAN OF CARE
/78   Pulse 103   Temp 96.1  F (35.6  C) (Oral)   Resp 16   SpO2 96%        Pt arrived via EMS from [facility] at approxi. 1700. Pt AOx4, failure to thrive due to esophageal cancer/fainiting spells at home. Intermittent nausea/pain, decrease appetite. Still undergoing radiation at [facility] per pt and family   According to patient family pt was transitioned due to surgery for tube feeding insertion. Has a right chest port, right PIV. SBA, commode at bedside if needed. Doctor came in to evaluate pt. No orders yet.

## 2019-02-17 NOTE — PROGRESS NOTES
CLINICAL NUTRITION SERVICES - ASSESSMENT NOTE     Nutrition Prescription    RECOMMENDATIONS FOR MDs/PROVIDERS TO ORDER:  1. Consider endocrinology consult if BG is difficult to manage.     2. Monitor K, Mg, Phos and replace as needed. Pt is at low risk of refeeding syndrome given TPN started at previous hospital and pt currently on peripheral PN.     3. If enteral nutrition becomes POC once FT placed, please consult RD to assess and order TF.     Malnutrition Status:    Severe malnutrition in the context of acute on chronic illness    Recommendations already ordered by Registered Dietitian (RD):  --Use dosing weight 64 kg  --Tonight (2/17/19) at 8pm, discontinue peripheral PN and begin TPN via central line, goal volume 1440 ml/day with initial 140 g Dex daily (476 kcal, GIR 1.5), 80 g AA daily (320 kcal), and 250 ml 20% IV lipids 5x/wk (M-F).  Micro/Rx: Infuvite and MTE-5 or per PharmD discretion  --ONLY once pt receives ~100% of initial continuous PN volume with K+/Mg++/Phos WNL, advance PN dex by 20 g every 1-2 days (pending lytes/Glu and Pharm D/RD discretion) to initial goal of 180 g Dex (612 kcal) to increase provisions to 1289 kcals (20 kcal/kg/day), 1.3 g PRO/kg/day, GIR 1.95 with 28% kcals from Fat.    Future/Additional Recommendations:  -Monitor TPN tolerance/advancement to goal.  -Monitor TG level (was 221 H on 2/13/19 and hx of elevated TG per Care Everywhere) and adjust dextrose/lipid provision if needed.   -Monitor FT placement and transition to EN:  Isosource 1.5 @ 10 mL/hr q8h pending tolerance to goal 50 ml/hr (1200 ml/day) to provide 1800 kcals (28 kcal/kg/day), 82 g PRO (1.3 g/kg/day), 912 ml free H2O, 211 g CHO and 18 g Fiber daily.       REASON FOR ASSESSMENT  Truong Jones is a/an 84 year old male assessed by the dietitian for Admission Nutrition Risk Screen for reduced oral intake over the last month and Pharmacy/Nutrition to Start and Manage PN    NUTRITION HISTORY  PMH/Reason for  "Admission: T2DM, HTN, distal esophageal adenocarcinoma s/p radiation and chemotherapy, with subsequent difficulty with oral intake and epigastric pain, resulting in recent dehydration and failure to thrive.     Nutrition Hx:   -Per H&P, \"TPN was initiated and patient was transferred to [facility] on 2/16/19 for further management and feeding tube placement...He had previously been scheduled for J tube placement on 2/1/19, however it was cancelled because patient had been taking in enough food orally.\" -Pt reports last significant PO was part of a Boost drink a few days ago at [facility]. He reports difficulty swallowing over past couple weeks that has prevented him from eating/drinking much at all. He has also experienced intermittent N/V symptoms. Prior to this, he reports he was eating well.     CURRENT NUTRITION ORDERS  Diet: NPO  Intake/Tolerance: Not tolerating per chart review.    LABS  Labs reviewed  -K 3.5 (WNL)  -Mg 2.1 (WNL)  -Phos 3.3 (WNL)  -LFTs WNL  -Glucose 120s-150s since admission  - (H) on 2/13/19 per [facility] records in Care Everywhere    MEDICATIONS  Medications reviewed  -Clinimix E with Lipids @ 55 mL/hr - provides 949 kcal/day (15 kcal/kg) and 56 g protein (0.9 g PRO/kg) daily, which does not meet minimum estimated needs  -Senna-docusate  -Insulin (Novolog)    ANTHROPOMETRICS  Height: 0 cm (Data Unavailable)  Ht Readings from Last 2 Encounters:   12/18/18 1.595 m (5' 2.8\")   Most Recent Weight: 64 kg (141 lb)    IBW: 56.4 kg (113% IBW)  BMI: 25.14 kg/m2; Overweight BMI 25-29.9  Weight History: Wt fairly stable over past couple months based on wt hx below. However, pt reports a 30 pound weight loss over the last 6 months (~18% wt loss).  Wt Readings from Last 10 Encounters:   02/16/19 64 kg (141 lb)   12/18/18 64.5 kg (142 lb 3.2 oz)   Dosing Weight: 64 kg (actual, based on admission wt of 64 kg on 2/16/19)    ASSESSED NUTRITION NEEDS  Estimated Energy Needs: 9916-5150 kcals/day (20 " - 25 kcals/kg) with TPN or 5773-9230 kcals/day (25-30 kcal/kg) with PO/EN  Justification: Maintenance  Estimated Protein Needs: 77-96 grams protein/day (1.2 - 1.5 grams of pro/kg)  Justification: Hypercatabolism with acute illness  Estimated Fluid Needs: 1 mL/kcal   Justification: Maintenance    PHYSICAL FINDINGS  See malnutrition section below.    MALNUTRITION  (Based on Upper Body assessment and visualization as pt was nauseated and vomiting during RD visit)  % Intake: Decreased intake does not meet criteria; on TPN at [facility] and currently on PPN  % Weight Loss: > 10% in 6 months (severe); 18% per pt report  Subcutaneous Fat Loss: Facial region, Lower arm and Thoracic/intercostal: Mild  Muscle Loss: Temporal, Lower arm  (forearm) and Dorsal hand: Mild-Moderate (pt is unsure if he has noticed muscle loss)  Fluid Accumulation/Edema: Does not meet criteria (1+ trace bipedal edema per RN flowsheet)  Malnutrition Diagnosis: Severe malnutrition in the context of acute on chronic illness    NUTRITION DIAGNOSIS  Inadequate protein-energy intake related to difficulty swallowing and nausea/vomiting over past couple weeks and inadequate PN provision as evidenced by pt report of minimal intake over past couple weeks d/t these symptoms, wt loss of 30 lbs over past 6 months, pt currently on PPN that only meets ~70% of minimum estimated kcal and protein needs and need for TPN to meet nutrition needs at this time.     INTERVENTIONS  Implementation  -Nutrition Education: Provided education on RD role and TPN.   -Parenteral Nutrition/IV Fluids - Initiate   -Collaboration with other providers - Discussed recs with PharmD.     Goals  Total avg nutritional intake to meet a minimum of 20 kcal/kg with TPN or 25 kcal/kg with EN/PO and 1.2 g PRO/kg daily (per dosing wt 64 kg).     Monitoring/Evaluation  Progress toward goals will be monitored and evaluated per protocol.    Marguerite Nguyen RD, LD  Weekend Pager: [phone]

## 2019-02-17 NOTE — H&P
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 Patient was initially admitted to [facility] on 2/12/19 for dehydration and failure to thrive. TPN was initiated and patient was transferred to [facility] on 2/16/19 for further management and feeding tube placement since Dr. Szymanski is planning on doing his surgery eventually. He had previously been scheduled for J tube placement on 2/1/19, however it was cancelled because patient had been taking in enough food orally.    Now, patient reports that it has been quite difficult to take anything by mouth. He is able to take small pills or pills if they are crushed. This is his second hospital admission for dehydration, with his first in January. He was given Carafate and magic mouthwash, which helped for a while, however he recently has not found them to be helpful. Patient reports a 30 pound weight loss over the last 6 months. He has been having hiccups. Patient denies chest pain, SOB, fever/chills, nausea/vomiting, dysuria. Patient's daughter and granddaughter are at bedside. They expressed some concerns about patient falling, They state that he last fell 2 years ago and he broke his wrist.     REVIEW OF SYSTEMS: The remainder of the complete ROS was negative unless noted in the HPI.    PAST MEDICAL HISTORY:   Past Medical History:   Diagnosis Date     Personal history of prostate cancer 12/25/2018                       Polymyalgia rheumatica (H)  Colitis  Depression  Cataracts bilaterally  Arthritis  HLD 12/25/2018       PAST  SURGICAL HISTORY:   - Prostatectomy 2007  - R knee surgery  - R rotator cuff surgery  - Tonsillectomy as a kid  - blepharoplasty bilateral upper eyelids, bilateral lower eyelids    ALLERGIES:    Allergies   Allergen Reactions     Morphine      B/p dropped       HOME MEDICATIONS:     No current facility-administered medications on file prior to encounter.   Current Outpatient Medications on File Prior to Encounter:  amLODIPine (NORVASC) 2.5 MG tablet    atorvastatin (LIPITOR) 20 MG tablet    blood glucose monitoring (ACCU-CHEK RABIA PLUS) test strip USE AS DIRECTED TWICE DAILY   Calcium Citrate-Vitamin D (CITRACAL + D) 250-200 MG-UNIT TABS Take 1 tablet by mouth   Cholecalciferol (D 1000) 1000 units CAPS Take 1,000 Units by mouth   diphenoxylate-atropine (LOMOTIL) 2.5-0.025 MG tablet    escitalopram (LEXAPRO) 20 MG tablet Take 20 mg by mouth   fluocinonide (LIDEX) 0.05 % external ointment Apply to itchy hand rash 2-3 x day   lisinopril (PRINIVIL/ZESTRIL) 20 MG tablet    metFORMIN (GLUCOPHAGE) 1000 MG tablet TAKE ONE TABLET BY MOUTH TWICE A DAY   metoprolol succinate ER (TOPROL-XL) 25 MG 24 hr tablet Take 12.5 mg by mouth daily   Multiple vitamin TABS    Multiple Vitamins-Minerals (OCUVITE PRESERVISION PO)    omeprazole (PRILOSEC) 20 MG DR capsule        SOCIAL HISTORY:   Social History     Tobacco Use     Smoking status: Former Smoker     Types: Cigarettes, Cigars     Start date:      Last attempt to quit: 1978     Years since quittin.1     Smokeless tobacco: Never Used   Substance Use Topics     Alcohol use: No     Frequency: No use for > 30 years     Drug use: No       FAMILY HISTORY:  - T2DM- brother  -   PHYSICAL EXAMINATION:  Temp:  [96.1  F (35.6  C)-97.9  F (36.6  C)] 97.9  F (36.6  C)  Pulse:  [] 70  Resp:  [16] 16  BP: (158-162)/(78) 162/78  SpO2:  [96 %-97 %] 97 %   No intake/output data recorded.    General: awake, alert, oriented, NAD, lying in bed, appears comfortable  CV: RRR, no  murmurs, gallops, or rubs  Pulm: CTAB, breathing comfortably on room air  Abd: soft, non-distended, tender to palpation near epigastric area and to left of eipgastrum  : No jane  Extremities: warm, well-perfused with mild pitting edema of bilateral lower extremities up to mid shin  Neuro: No focal deficits noted, patient moves all extremities spontaneously, gait steady with assistance    LABS:  Recent Labs   Lab 02/16/19 1925   WBC 3.3*   RBC 3.00*   HGB 9.3*   HCT 27.6*   MCV 92   MCH 31.0   MCHC 33.7   RDW 17.0*   PLT 54*       Recent Labs   Lab 02/16/19 1925      POTASSIUM 3.3*   CHLORIDE 106   CO2 24   BUN 19   CR 0.59*   *   JAZMINE 8.0*   MAG 2.2   PHOS 3.0       Recent Labs   Lab 02/16/19 1925   AST 22   ALT 16   ALKPHOS 76   BILITOTAL 0.4   ALBUMIN 2.9*   INR 1.17*       ASSESSMENT: Truong Jones is a 84 year old male with 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 Patient was initially admitted to [facility] on 2/12/19 for dehydration and failure to thrive. TPN was initiated and patient was transferred to [facility] on 2/16/19 for further management and feeding tube placement since Dr. Szymanski is planning on doing his surgery eventually.    PLAN:    - Admit to thoracic surgery under Dr. Szymanski  - Feeding tube placement scheduled for Monday morning at 0730  - CLD as tolerated, mostly for comfort  - TPN via port  - sliding scale insulin   - Pain Management with Tylenol and oxycodone, muscle relaxant  - Nausea Management: zofran, compazine PRN  - Bowel Regimen: senna, miralax  - Labs now: CBC, CMP, Mg, Phos, INR, Alb, Prealbumin  - Ppx: subcutaneous heparin, PPI    Patient findings and plan discussed with Dr. Hercules, Thoracic Fellow, who will discuss with staff.    Christina Domingo MD (PGY1)  General Surgery Resident  Surgery Cross Cover

## 2019-02-17 NOTE — PLAN OF CARE
Patient denies SOb, clear LS, VSS on RA. Lactic triggered with a result of 1.1,  +BS, soft non tender abdomen. Refused Senna. Denies nausea. BG Q 4- 123, 131, 129. Continuous TPN started at 55 ml/hr with Lipids running via right chest port at 20.8, new PIV SL. Voiding large clear urine output. SBA. Uses call light appropriately. Continue poc.   Vitals:    02/16/19 2005 02/16/19 2045 02/16/19 2234 02/17/19 0407   BP: 162/78  164/78 142/72   BP Location:    Right arm   Pulse: 70  64 75   Resp: 16  16 16   Temp: 97.9  F (36.6  C)  99.3  F (37.4  C) 97.4  F (36.3  C)   TempSrc: Oral  Oral Oral   SpO2: 97%  96% 97%   Weight:  64 kg (141 lb)

## 2019-02-17 NOTE — PROGRESS NOTES
"   02/17/19 1200   General Information   Onset Date 02/16/19   Start of Care Date 02/10/19   Referring Physician Sun Laird MD   Patient Profile Review/OT: Additional Occupational Profile Info See Profile for full history and prior level of function   Patient/Family Goals Statement \"to eat prime rib\"   Swallowing Evaluation Bedside swallow evaluation   Behaviorial Observations WNL (within normal limits);Alert   Mode of current nutrition Oral diet   Type of oral diet Thin liquid  (clear liquid diet. 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    Clinical Swallow Evaluation   Oral Musculature generally intact   Structural Abnormalities none present   Dentition upper and lower dentures   Secretion Management (WFL)   Mucosal Quality adequate   Mandibular Strength and Mobility intact   Oral Labial Strength and Mobility WFL   Lingual Strength and Mobility WFL   Buccal Strength and Mobility intact   Laryngeal Function Cough;Throat clear;Swallow;Voicing initiated;Dry swallow palpated   Oral Musculature Comments WNL   Clinical Swallow Eval: Thin Liquid Texture Trial   Mode of Presentation, Thin Liquids cup;self-fed   Volume of Liquid or Food Presented 3 oz thin H20   Oral Phase of Swallow WFL   Pharyngeal Phase of Swallow intact   Diagnostic Statement WNL oropharyngeal swallow. Vomitting noted approx 1 min after the swallow, appeared to be all of H20 consumed.   Clinical Swallow Eval: Puree Solid Texture Trial   Mode of Presentation, Puree spoon;self-fed   Volume of Puree Presented 2 oz puree applesauce   Oral Phase, Puree WFL   Pharyngeal Phase, Puree intact   Diagnostic Statement WNL oropharyngeal swallow. Vomitting noted approx 1 min after the swallow, appeared to vomit 100% of puree trial consumed. 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   Diet texture recommendations (defer to MD due to esophageal dysphagia)   Recommended Feeding/Eating Techniques alternate between small bites and sips of food/liquid;maintain upright posture during/after eating for 30 mins;small sips/bites   Demonstrates Need for Referral to Another Service occupational therapy;physical therapy   Therapy Frequency (evaluation only)   Anticipated Discharge Disposition (no ongoing SLP needs. Defer to MD.)   Risks and Benefits of Treatment have been explained. Yes   Patient, family and/or staff in agreement with Plan of Care Yes   Clinical Impression Comments Clinical dysphagia examination completed per MD order. The patient is pleasant, cooperative and alert/oriented. He presents with intact oral mechanism (is edentulous, has upper & lower dentures). He fed himself small sips and bites of thin H20 and puree applesauce with intact oropharyngeal mechanism. However, approx 1 min after the swallow he experienced discomfort and significant nausea/vomiting (despite having just taken anti nausea medication). No diet modification is necessary from an oropharyngeal swallow perspective. However, the patient is at risk for aspiration of vomit after the swallow, dehydration and inadequate nutrition. SLP will sign off as oropharyngeal swallow is intact. Agree with plan for TF and nutrition consult.    Total  Evaluation Time   Total Evaluation Time (Minutes) 17

## 2019-02-17 NOTE — PLAN OF CARE
Discharge Planner SLP   Patient plan for discharge: Not stated  Current status: Clinical dysphagia examination completed per MD order. The patient is pleasant, cooperative and alert/oriented. He presents with intact oral mechanism (is edentulous, has upper & lower dentures). He fed himself small sips and bites of thin H20 and puree applesauce with intact oropharyngeal mechanism. However, approx 1 min after the swallow he experienced discomfort and significant nausea/vomiting (despite having just taken anti nausea medication). No diet modification is necessary from an oropharyngeal swallow perspective. However, the patient is at risk for aspiration of vomit after the swallow, dehydration and inadequate nutrition. SLP will sign off as oropharyngeal swallow is intact. Agree with plan for TF and nutrition consult.   Barriers to return to prior living situation: inadequate nutrition and PO intake  Recommendations for discharge: defer to MD  Rationale for recommendations: no ongoing SLP tx indicated at this time       Entered by: Gracy Munguia 02/17/2019 12:11 PM

## 2019-02-17 NOTE — PROGRESS NOTES
Thoracic Surgery Progress Note    S: No acute events overnight. Voiding and having bowel movements. Has been OOB. No complaints.     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     -Bedside swallow evaluation showed intact oropharyngeal mechanism but n/v after swallow  -Will make NPO for now, continue TPN   -While NPO adding LR @ 40   -Repleting K  -Feeding tube placement tomorrow  -NPO at midnight  -Preop orders in   -Consent obtained    Patient seen with staff    Sun Laird, PGY1  Thoracic Surgery

## 2019-02-17 NOTE — PROVIDER NOTIFICATION
On this shift:  Notified provider, K+ 3.3, PRN replacement order placed by provider, K+ level this morning 3.5 and Calcium 7.9. Provider notified

## 2019-02-17 NOTE — PHARMACY-ADMISSION MEDICATION HISTORY
Admission medication history interview status for the 2/16/2019 admission is complete. See Epic admission navigator for allergy information, pharmacy, prior to admission medications and immunization status.     Medication history interview sources:  [facility]'s record and MAR from [facility]    Changes made to PTA medication list (reason)  Added: acetaminophen, baclofen, magic mouthwash, oxycodone, Miralax, prochlorperazine, senna, sucralfate  Deleted: metoprolol, atorvastatin, multivitamin, calcium-vit D, Lomotil, Lidex  Changed: updated the directions for amlodipine, escitalopram, lisinopril, metformin, omeprazole    Additional medication history information (including reliability of information, actions taken by pharmacist):  - Per [facility]' record, atorvastatin was stopped and metformin dose was decreased to 1000 mg once daily (from twice daily) in January 2019 due to poor oral intake.    Prior to Admission medications    Medication Sig Last Dose Taking? Auth Provider   acetaminophen (PAIN & FEVER CHILDRENS) 160 MG/5ML solution Take 327-650 mg by mouth every 6 hours as needed for pain  PRN Yes Unknown, Entered By History   amLODIPine (NORVASC) 2.5 MG tablet Take 2.5 mg by mouth daily  2/16/2019 at AM Yes Reported, Patient   baclofen (LIORESAL) 10 MG tablet Take 10 mg by mouth 3 times daily as needed (hiccups)  PRN Yes Unknown, Entered By History   escitalopram (LEXAPRO) 20 MG tablet Take 20 mg by mouth daily  2/16/2019 at AM Yes Reported, Patient   lisinopril (PRINIVIL/ZESTRIL) 20 MG tablet Take 20 mg by mouth daily  2/16/2019 at AM Yes Reported, Patient   magic mouthwash (FIRST-MOUTHWASH BLM) compounding kit Swish and swallow 5 mLs in mouth 3 times daily (before meals) 2/15/2019 Yes Unknown, Entered By History   metFORMIN (GLUCOPHAGE) 1000 MG tablet Take 1,000 mg by mouth daily Past Month Yes Unknown, Entered By History   omeprazole (PRILOSEC) 20 MG DR capsule Take 20 mg by mouth daily  Past Week Yes  Reported, Patient   oxyCODONE (ROXICODONE) 5 MG/5ML solution Take 5-7.5 mg by mouth every 4 hours as needed for pain  2/16/2019 Yes Unknown, Entered By History   polyethylene glycol (MIRALAX/GLYCOLAX) packet Take 17 g by mouth daily 2/15/2019 Yes Unknown, Entered By History   prochlorperazine (COMPAZINE) 10 MG tablet Take 10 mg by mouth every 6 hours as needed for nausea or vomiting  2/15/2019 Yes Unknown, Entered By History   Senna 176 MG/5ML SYRP Take 5 mLs by mouth daily 2/15/2019 Yes Unknown, Entered By History   sucralfate (CARAFATE) 1 GM/10ML suspension Take 1 g by mouth 3 times daily 2/16/2019 at AM Yes Unknown, Entered By History   blood glucose monitoring (ACCU-CHEK RABIA PLUS) test strip USE AS DIRECTED TWICE DAILY   Reported, Patient        Medication history completed by: Cira Saldaña, PharmD

## 2019-02-18 ENCOUNTER — SURGERY (OUTPATIENT)
Age: 84
End: 2019-02-18
Payer: MEDICARE

## 2019-02-18 ENCOUNTER — ANESTHESIA (OUTPATIENT)
Dept: SURGERY | Facility: CLINIC | Age: 84
DRG: 374 | End: 2019-02-18
Payer: MEDICARE

## 2019-02-18 LAB
ABO + RH BLD: NORMAL
ABO + RH BLD: NORMAL
ALBUMIN SERPL-MCNC: 2.8 G/DL (ref 3.4–5)
ALP SERPL-CCNC: 77 U/L (ref 40–150)
ALT SERPL W P-5'-P-CCNC: 15 U/L (ref 0–70)
ANION GAP SERPL CALCULATED.3IONS-SCNC: 8 MMOL/L (ref 3–14)
AST SERPL W P-5'-P-CCNC: 20 U/L (ref 0–45)
BILIRUB SERPL-MCNC: 0.5 MG/DL (ref 0.2–1.3)
BLD GP AB SCN SERPL QL: NORMAL
BLOOD BANK CMNT PATIENT-IMP: NORMAL
BUN SERPL-MCNC: 17 MG/DL (ref 7–30)
CALCIUM SERPL-MCNC: 8 MG/DL (ref 8.5–10.1)
CHLORIDE SERPL-SCNC: 103 MMOL/L (ref 94–109)
CO2 SERPL-SCNC: 26 MMOL/L (ref 20–32)
CREAT SERPL-MCNC: 0.67 MG/DL (ref 0.66–1.25)
ERYTHROCYTE [DISTWIDTH] IN BLOOD BY AUTOMATED COUNT: 17 % (ref 10–15)
GFR SERPL CREATININE-BSD FRML MDRD: 88 ML/MIN/{1.73_M2}
GLUCOSE BLDC GLUCOMTR-MCNC: 151 MG/DL (ref 70–99)
GLUCOSE BLDC GLUCOMTR-MCNC: 154 MG/DL (ref 70–99)
GLUCOSE BLDC GLUCOMTR-MCNC: 155 MG/DL (ref 70–99)
GLUCOSE BLDC GLUCOMTR-MCNC: 160 MG/DL (ref 70–99)
GLUCOSE BLDC GLUCOMTR-MCNC: 186 MG/DL (ref 70–99)
GLUCOSE BLDC GLUCOMTR-MCNC: 201 MG/DL (ref 70–99)
GLUCOSE BLDC GLUCOMTR-MCNC: 212 MG/DL (ref 70–99)
GLUCOSE BLDC GLUCOMTR-MCNC: 219 MG/DL (ref 70–99)
GLUCOSE BLDC GLUCOMTR-MCNC: 239 MG/DL (ref 70–99)
GLUCOSE BLDC GLUCOMTR-MCNC: 243 MG/DL (ref 70–99)
GLUCOSE SERPL-MCNC: 164 MG/DL (ref 70–99)
HCT VFR BLD AUTO: 29.5 % (ref 40–53)
HGB BLD-MCNC: 9.7 G/DL (ref 13.3–17.7)
INR PPP: 1.15 (ref 0.86–1.14)
MAGNESIUM SERPL-MCNC: 2.2 MG/DL (ref 1.6–2.3)
MCH RBC QN AUTO: 30.7 PG (ref 26.5–33)
MCHC RBC AUTO-ENTMCNC: 32.9 G/DL (ref 31.5–36.5)
MCV RBC AUTO: 93 FL (ref 78–100)
PHOSPHATE SERPL-MCNC: 3.4 MG/DL (ref 2.5–4.5)
PLATELET # BLD AUTO: 57 10E9/L (ref 150–450)
POTASSIUM SERPL-SCNC: 3.3 MMOL/L (ref 3.4–5.3)
PREALB SERPL IA-MCNC: 13 MG/DL (ref 15–45)
PROT SERPL-MCNC: 5.7 G/DL (ref 6.8–8.8)
RBC # BLD AUTO: 3.16 10E12/L (ref 4.4–5.9)
SODIUM SERPL-SCNC: 137 MMOL/L (ref 133–144)
SPECIMEN EXP DATE BLD: NORMAL
TRIGL SERPL-MCNC: 92 MG/DL
WBC # BLD AUTO: 3.2 10E9/L (ref 4–11)

## 2019-02-18 PROCEDURE — 93005 ELECTROCARDIOGRAM TRACING: CPT

## 2019-02-18 PROCEDURE — 85610 PROTHROMBIN TIME: CPT | Performed by: ANESTHESIOLOGY

## 2019-02-18 PROCEDURE — 82565 ASSAY OF CREATININE: CPT | Performed by: ANESTHESIOLOGY

## 2019-02-18 PROCEDURE — 25000128 H RX IP 250 OP 636: Performed by: STUDENT IN AN ORGANIZED HEALTH CARE EDUCATION/TRAINING PROGRAM

## 2019-02-18 PROCEDURE — 25000565 ZZH ISOFLURANE, EA 15 MIN: Performed by: THORACIC SURGERY (CARDIOTHORACIC VASCULAR SURGERY)

## 2019-02-18 PROCEDURE — 84478 ASSAY OF TRIGLYCERIDES: CPT | Performed by: ANESTHESIOLOGY

## 2019-02-18 PROCEDURE — 12000001 ZZH R&B MED SURG/OB UMMC

## 2019-02-18 PROCEDURE — 36000059 ZZH SURGERY LEVEL 3 EA 15 ADDTL MIN UMMC: Performed by: THORACIC SURGERY (CARDIOTHORACIC VASCULAR SURGERY)

## 2019-02-18 PROCEDURE — 83735 ASSAY OF MAGNESIUM: CPT | Performed by: ANESTHESIOLOGY

## 2019-02-18 PROCEDURE — 00000146 ZZHCL STATISTIC GLUCOSE BY METER IP

## 2019-02-18 PROCEDURE — 80053 COMPREHEN METABOLIC PANEL: CPT | Performed by: THORACIC SURGERY (CARDIOTHORACIC VASCULAR SURGERY)

## 2019-02-18 PROCEDURE — 93010 ELECTROCARDIOGRAM REPORT: CPT | Performed by: INTERNAL MEDICINE

## 2019-02-18 PROCEDURE — 37000008 ZZH ANESTHESIA TECHNICAL FEE, 1ST 30 MIN: Performed by: THORACIC SURGERY (CARDIOTHORACIC VASCULAR SURGERY)

## 2019-02-18 PROCEDURE — 25000132 ZZH RX MED GY IP 250 OP 250 PS 637: Mod: GY | Performed by: ANESTHESIOLOGY

## 2019-02-18 PROCEDURE — 0DJ08ZZ INSPECTION OF UPPER INTESTINAL TRACT, VIA NATURAL OR ARTIFICIAL OPENING ENDOSCOPIC: ICD-10-PCS | Performed by: THORACIC SURGERY (CARDIOTHORACIC VASCULAR SURGERY)

## 2019-02-18 PROCEDURE — 40000275 ZZH STATISTIC RCP TIME EA 10 MIN

## 2019-02-18 PROCEDURE — 40000170 ZZH STATISTIC PRE-PROCEDURE ASSESSMENT II: Performed by: THORACIC SURGERY (CARDIOTHORACIC VASCULAR SURGERY)

## 2019-02-18 PROCEDURE — 84100 ASSAY OF PHOSPHORUS: CPT | Performed by: ANESTHESIOLOGY

## 2019-02-18 PROCEDURE — 86900 BLOOD TYPING SEROLOGIC ABO: CPT | Performed by: THORACIC SURGERY (CARDIOTHORACIC VASCULAR SURGERY)

## 2019-02-18 PROCEDURE — 25000132 ZZH RX MED GY IP 250 OP 250 PS 637: Mod: GY | Performed by: STUDENT IN AN ORGANIZED HEALTH CARE EDUCATION/TRAINING PROGRAM

## 2019-02-18 PROCEDURE — 44186 LAP JEJUNOSTOMY: CPT | Mod: GC | Performed by: THORACIC SURGERY (CARDIOTHORACIC VASCULAR SURGERY)

## 2019-02-18 PROCEDURE — 71000015 ZZH RECOVERY PHASE 1 LEVEL 2 EA ADDTL HR: Performed by: THORACIC SURGERY (CARDIOTHORACIC VASCULAR SURGERY)

## 2019-02-18 PROCEDURE — C1894 INTRO/SHEATH, NON-LASER: HCPCS | Performed by: THORACIC SURGERY (CARDIOTHORACIC VASCULAR SURGERY)

## 2019-02-18 PROCEDURE — 25800030 ZZH RX IP 258 OP 636: Performed by: ANESTHESIOLOGY

## 2019-02-18 PROCEDURE — C1769 GUIDE WIRE: HCPCS | Performed by: THORACIC SURGERY (CARDIOTHORACIC VASCULAR SURGERY)

## 2019-02-18 PROCEDURE — 36000057 ZZH SURGERY LEVEL 3 1ST 30 MIN - UMMC: Performed by: THORACIC SURGERY (CARDIOTHORACIC VASCULAR SURGERY)

## 2019-02-18 PROCEDURE — 25000128 H RX IP 250 OP 636: Performed by: THORACIC SURGERY (CARDIOTHORACIC VASCULAR SURGERY)

## 2019-02-18 PROCEDURE — A9270 NON-COVERED ITEM OR SERVICE: HCPCS | Mod: GY | Performed by: STUDENT IN AN ORGANIZED HEALTH CARE EDUCATION/TRAINING PROGRAM

## 2019-02-18 PROCEDURE — 80053 COMPREHEN METABOLIC PANEL: CPT | Performed by: ANESTHESIOLOGY

## 2019-02-18 PROCEDURE — 0DHA4UZ INSERTION OF FEEDING DEVICE INTO JEJUNUM, PERCUTANEOUS ENDOSCOPIC APPROACH: ICD-10-PCS | Performed by: THORACIC SURGERY (CARDIOTHORACIC VASCULAR SURGERY)

## 2019-02-18 PROCEDURE — 25000125 ZZHC RX 250: Performed by: THORACIC SURGERY (CARDIOTHORACIC VASCULAR SURGERY)

## 2019-02-18 PROCEDURE — 36415 COLL VENOUS BLD VENIPUNCTURE: CPT | Performed by: ANESTHESIOLOGY

## 2019-02-18 PROCEDURE — 25800030 ZZH RX IP 258 OP 636: Performed by: STUDENT IN AN ORGANIZED HEALTH CARE EDUCATION/TRAINING PROGRAM

## 2019-02-18 PROCEDURE — 71000014 ZZH RECOVERY PHASE 1 LEVEL 2 FIRST HR: Performed by: THORACIC SURGERY (CARDIOTHORACIC VASCULAR SURGERY)

## 2019-02-18 PROCEDURE — 25000128 H RX IP 250 OP 636: Performed by: ANESTHESIOLOGY

## 2019-02-18 PROCEDURE — 86850 RBC ANTIBODY SCREEN: CPT | Performed by: THORACIC SURGERY (CARDIOTHORACIC VASCULAR SURGERY)

## 2019-02-18 PROCEDURE — 27210794 ZZH OR GENERAL SUPPLY STERILE: Performed by: THORACIC SURGERY (CARDIOTHORACIC VASCULAR SURGERY)

## 2019-02-18 PROCEDURE — 37000009 ZZH ANESTHESIA TECHNICAL FEE, EACH ADDTL 15 MIN: Performed by: THORACIC SURGERY (CARDIOTHORACIC VASCULAR SURGERY)

## 2019-02-18 PROCEDURE — 85027 COMPLETE CBC AUTOMATED: CPT | Performed by: THORACIC SURGERY (CARDIOTHORACIC VASCULAR SURGERY)

## 2019-02-18 PROCEDURE — 25000125 ZZHC RX 250: Performed by: ANESTHESIOLOGY

## 2019-02-18 PROCEDURE — 86901 BLOOD TYPING SEROLOGIC RH(D): CPT | Performed by: THORACIC SURGERY (CARDIOTHORACIC VASCULAR SURGERY)

## 2019-02-18 PROCEDURE — 25000125 ZZHC RX 250: Performed by: STUDENT IN AN ORGANIZED HEALTH CARE EDUCATION/TRAINING PROGRAM

## 2019-02-18 RX ORDER — SODIUM CHLORIDE, SODIUM LACTATE, POTASSIUM CHLORIDE, CALCIUM CHLORIDE 600; 310; 30; 20 MG/100ML; MG/100ML; MG/100ML; MG/100ML
INJECTION, SOLUTION INTRAVENOUS CONTINUOUS
Status: DISCONTINUED | OUTPATIENT
Start: 2019-02-18 | End: 2019-02-18 | Stop reason: HOSPADM

## 2019-02-18 RX ORDER — NALOXONE HYDROCHLORIDE 0.4 MG/ML
.1-.4 INJECTION, SOLUTION INTRAMUSCULAR; INTRAVENOUS; SUBCUTANEOUS
Status: DISCONTINUED | OUTPATIENT
Start: 2019-02-18 | End: 2019-02-18

## 2019-02-18 RX ORDER — BUPIVACAINE HYDROCHLORIDE 2.5 MG/ML
INJECTION, SOLUTION INFILTRATION; PERINEURAL PRN
Status: DISCONTINUED | OUTPATIENT
Start: 2019-02-18 | End: 2019-02-18 | Stop reason: HOSPADM

## 2019-02-18 RX ORDER — ONDANSETRON 2 MG/ML
INJECTION INTRAMUSCULAR; INTRAVENOUS PRN
Status: DISCONTINUED | OUTPATIENT
Start: 2019-02-18 | End: 2019-02-18

## 2019-02-18 RX ORDER — SODIUM CHLORIDE, SODIUM LACTATE, POTASSIUM CHLORIDE, CALCIUM CHLORIDE 600; 310; 30; 20 MG/100ML; MG/100ML; MG/100ML; MG/100ML
INJECTION, SOLUTION INTRAVENOUS CONTINUOUS PRN
Status: DISCONTINUED | OUTPATIENT
Start: 2019-02-18 | End: 2019-02-18

## 2019-02-18 RX ORDER — SUCRALFATE ORAL 1 G/10ML
1 SUSPENSION ORAL 3 TIMES DAILY
Status: DISCONTINUED | OUTPATIENT
Start: 2019-02-18 | End: 2019-02-22 | Stop reason: HOSPADM

## 2019-02-18 RX ORDER — OXYCODONE HCL 5 MG/5 ML
5-7.5 SOLUTION, ORAL ORAL EVERY 4 HOURS PRN
Status: DISCONTINUED | OUTPATIENT
Start: 2019-02-18 | End: 2019-02-22 | Stop reason: HOSPADM

## 2019-02-18 RX ORDER — LIDOCAINE 40 MG/G
CREAM TOPICAL
Status: DISCONTINUED | OUTPATIENT
Start: 2019-02-18 | End: 2019-02-18 | Stop reason: HOSPADM

## 2019-02-18 RX ORDER — CEFAZOLIN SODIUM 1 G/3ML
1 INJECTION, POWDER, FOR SOLUTION INTRAMUSCULAR; INTRAVENOUS SEE ADMIN INSTRUCTIONS
Status: DISCONTINUED | OUTPATIENT
Start: 2019-02-18 | End: 2019-02-18 | Stop reason: HOSPADM

## 2019-02-18 RX ORDER — LABETALOL HYDROCHLORIDE 5 MG/ML
INJECTION, SOLUTION INTRAVENOUS PRN
Status: DISCONTINUED | OUTPATIENT
Start: 2019-02-18 | End: 2019-02-18

## 2019-02-18 RX ORDER — LABETALOL HYDROCHLORIDE 5 MG/ML
10 INJECTION, SOLUTION INTRAVENOUS
Status: DISCONTINUED | OUTPATIENT
Start: 2019-02-18 | End: 2019-02-18 | Stop reason: HOSPADM

## 2019-02-18 RX ORDER — POLYETHYLENE GLYCOL 3350 17 G/17G
17 POWDER, FOR SOLUTION ORAL DAILY
Status: DISCONTINUED | OUTPATIENT
Start: 2019-02-19 | End: 2019-02-22 | Stop reason: HOSPADM

## 2019-02-18 RX ORDER — DEXTROSE MONOHYDRATE 25 G/50ML
25-50 INJECTION, SOLUTION INTRAVENOUS
Status: DISCONTINUED | OUTPATIENT
Start: 2019-02-18 | End: 2019-02-18 | Stop reason: HOSPADM

## 2019-02-18 RX ORDER — ONDANSETRON 2 MG/ML
4 INJECTION INTRAMUSCULAR; INTRAVENOUS EVERY 30 MIN PRN
Status: DISCONTINUED | OUTPATIENT
Start: 2019-02-18 | End: 2019-02-18 | Stop reason: HOSPADM

## 2019-02-18 RX ORDER — ONDANSETRON 4 MG/1
4 TABLET, ORALLY DISINTEGRATING ORAL EVERY 30 MIN PRN
Status: DISCONTINUED | OUTPATIENT
Start: 2019-02-18 | End: 2019-02-18 | Stop reason: HOSPADM

## 2019-02-18 RX ORDER — PROPOFOL 10 MG/ML
INJECTION, EMULSION INTRAVENOUS PRN
Status: DISCONTINUED | OUTPATIENT
Start: 2019-02-18 | End: 2019-02-18

## 2019-02-18 RX ORDER — ONDANSETRON 2 MG/ML
4 INJECTION INTRAMUSCULAR; INTRAVENOUS EVERY 6 HOURS PRN
Status: DISCONTINUED | OUTPATIENT
Start: 2019-02-18 | End: 2019-02-18

## 2019-02-18 RX ORDER — ONDANSETRON 4 MG/1
4 TABLET, ORALLY DISINTEGRATING ORAL EVERY 6 HOURS PRN
Status: DISCONTINUED | OUTPATIENT
Start: 2019-02-18 | End: 2019-02-18

## 2019-02-18 RX ORDER — ESMOLOL HYDROCHLORIDE 10 MG/ML
INJECTION INTRAVENOUS PRN
Status: DISCONTINUED | OUTPATIENT
Start: 2019-02-18 | End: 2019-02-18

## 2019-02-18 RX ORDER — CEFAZOLIN SODIUM 2 G/100ML
2 INJECTION, SOLUTION INTRAVENOUS
Status: COMPLETED | OUTPATIENT
Start: 2019-02-18 | End: 2019-02-18

## 2019-02-18 RX ORDER — HYDRALAZINE HYDROCHLORIDE 20 MG/ML
2.5-5 INJECTION INTRAMUSCULAR; INTRAVENOUS EVERY 10 MIN PRN
Status: DISCONTINUED | OUTPATIENT
Start: 2019-02-18 | End: 2019-02-18 | Stop reason: HOSPADM

## 2019-02-18 RX ORDER — CEFAZOLIN SODIUM 2 G/100ML
2 INJECTION, SOLUTION INTRAVENOUS
Status: DISCONTINUED | OUTPATIENT
Start: 2019-02-18 | End: 2019-02-18 | Stop reason: HOSPADM

## 2019-02-18 RX ORDER — NICOTINE POLACRILEX 4 MG
15-30 LOZENGE BUCCAL
Status: DISCONTINUED | OUTPATIENT
Start: 2019-02-18 | End: 2019-02-18 | Stop reason: HOSPADM

## 2019-02-18 RX ORDER — FENTANYL CITRATE 50 UG/ML
25-50 INJECTION, SOLUTION INTRAMUSCULAR; INTRAVENOUS
Status: DISCONTINUED | OUTPATIENT
Start: 2019-02-18 | End: 2019-02-18 | Stop reason: HOSPADM

## 2019-02-18 RX ORDER — DEXAMETHASONE SODIUM PHOSPHATE 4 MG/ML
INJECTION, SOLUTION INTRA-ARTICULAR; INTRALESIONAL; INTRAMUSCULAR; INTRAVENOUS; SOFT TISSUE PRN
Status: DISCONTINUED | OUTPATIENT
Start: 2019-02-18 | End: 2019-02-18

## 2019-02-18 RX ORDER — FENTANYL CITRATE 50 UG/ML
INJECTION, SOLUTION INTRAMUSCULAR; INTRAVENOUS PRN
Status: DISCONTINUED | OUTPATIENT
Start: 2019-02-18 | End: 2019-02-18

## 2019-02-18 RX ADMIN — ESMOLOL HYDROCHLORIDE 30 MG: 10 INJECTION, SOLUTION INTRAVENOUS at 07:59

## 2019-02-18 RX ADMIN — Medication 10 MEQ: at 18:35

## 2019-02-18 RX ADMIN — BUPIVACAINE HYDROCHLORIDE 19 ML: 2.5 INJECTION, SOLUTION INFILTRATION; PERINEURAL at 09:45

## 2019-02-18 RX ADMIN — FENTANYL CITRATE 25 MCG: 50 INJECTION, SOLUTION INTRAMUSCULAR; INTRAVENOUS at 09:50

## 2019-02-18 RX ADMIN — DEXAMETHASONE SODIUM PHOSPHATE 4 MG: 4 INJECTION, SOLUTION INTRA-ARTICULAR; INTRALESIONAL; INTRAMUSCULAR; INTRAVENOUS; SOFT TISSUE at 07:55

## 2019-02-18 RX ADMIN — SODIUM CHLORIDE, POTASSIUM CHLORIDE, SODIUM LACTATE AND CALCIUM CHLORIDE: 600; 310; 30; 20 INJECTION, SOLUTION INTRAVENOUS at 07:44

## 2019-02-18 RX ADMIN — PROPOFOL 90 MG: 10 INJECTION, EMULSION INTRAVENOUS at 07:55

## 2019-02-18 RX ADMIN — INSULIN ASPART 3 UNITS: 100 INJECTION, SOLUTION INTRAVENOUS; SUBCUTANEOUS at 20:27

## 2019-02-18 RX ADMIN — FENTANYL CITRATE 100 MCG: 50 INJECTION, SOLUTION INTRAMUSCULAR; INTRAVENOUS at 07:55

## 2019-02-18 RX ADMIN — POTASSIUM CHLORIDE: 2 INJECTION, SOLUTION, CONCENTRATE INTRAVENOUS at 20:31

## 2019-02-18 RX ADMIN — SODIUM CHLORIDE, POTASSIUM CHLORIDE, SODIUM LACTATE AND CALCIUM CHLORIDE: 600; 310; 30; 20 INJECTION, SOLUTION INTRAVENOUS at 14:20

## 2019-02-18 RX ADMIN — ONDANSETRON 4 MG: 2 INJECTION INTRAMUSCULAR; INTRAVENOUS at 09:39

## 2019-02-18 RX ADMIN — HUMAN INSULIN 5 UNITS: 100 INJECTION, SOLUTION SUBCUTANEOUS at 11:05

## 2019-02-18 RX ADMIN — CEFAZOLIN SODIUM 2 G: 2 INJECTION, SOLUTION INTRAVENOUS at 08:10

## 2019-02-18 RX ADMIN — ACETAMINOPHEN 650 MG: 325 SOLUTION ORAL at 19:54

## 2019-02-18 RX ADMIN — ROCURONIUM BROMIDE 10 MG: 10 INJECTION INTRAVENOUS at 09:13

## 2019-02-18 RX ADMIN — FAMOTIDINE 20 MG: 20 INJECTION, SOLUTION INTRAVENOUS at 21:11

## 2019-02-18 RX ADMIN — I.V. FAT EMULSION 250 ML: 20 EMULSION INTRAVENOUS at 20:32

## 2019-02-18 RX ADMIN — PROPOFOL 20 MG: 10 INJECTION, EMULSION INTRAVENOUS at 08:17

## 2019-02-18 RX ADMIN — FENTANYL CITRATE 25 MCG: 50 INJECTION, SOLUTION INTRAMUSCULAR; INTRAVENOUS at 09:48

## 2019-02-18 RX ADMIN — LABETALOL HYDROCHLORIDE 5 MG: 5 INJECTION INTRAVENOUS at 08:27

## 2019-02-18 RX ADMIN — PROPOFOL 40 MG: 10 INJECTION, EMULSION INTRAVENOUS at 07:59

## 2019-02-18 RX ADMIN — ROCURONIUM BROMIDE 40 MG: 10 INJECTION INTRAVENOUS at 07:55

## 2019-02-18 RX ADMIN — Medication 10 MEQ: at 19:57

## 2019-02-18 RX ADMIN — ESMOLOL HYDROCHLORIDE 20 MG: 10 INJECTION, SOLUTION INTRAVENOUS at 08:17

## 2019-02-18 RX ADMIN — LABETALOL HYDROCHLORIDE 10 MG: 5 INJECTION INTRAVENOUS at 09:44

## 2019-02-18 RX ADMIN — PHENYLEPHRINE HYDROCHLORIDE 50 MCG: 10 INJECTION, SOLUTION INTRAMUSCULAR; INTRAVENOUS; SUBCUTANEOUS at 08:36

## 2019-02-18 RX ADMIN — Medication 10 MEQ: at 22:23

## 2019-02-18 RX ADMIN — LABETALOL HYDROCHLORIDE 10 MG: 5 INJECTION INTRAVENOUS at 09:56

## 2019-02-18 RX ADMIN — INSULIN ASPART 1 UNITS: 100 INJECTION, SOLUTION INTRAVENOUS; SUBCUTANEOUS at 04:08

## 2019-02-18 RX ADMIN — SUGAMMADEX 120 MG: 100 INJECTION, SOLUTION INTRAVENOUS at 09:42

## 2019-02-18 RX ADMIN — LABETALOL HYDROCHLORIDE 5 MG: 5 INJECTION INTRAVENOUS at 09:31

## 2019-02-18 RX ADMIN — INSULIN ASPART 2 UNITS: 100 INJECTION, SOLUTION INTRAVENOUS; SUBCUTANEOUS at 16:53

## 2019-02-18 RX ADMIN — Medication 10 MEQ: at 21:09

## 2019-02-18 RX ADMIN — HUMAN INSULIN 4 UNITS: 100 INJECTION, SOLUTION SUBCUTANEOUS at 10:31

## 2019-02-18 ASSESSMENT — ACTIVITIES OF DAILY LIVING (ADL)
ADLS_ACUITY_SCORE: 18
ADLS_ACUITY_SCORE: 17
ADLS_ACUITY_SCORE: 18
ADLS_ACUITY_SCORE: 17

## 2019-02-18 NOTE — OR NURSING
Dr. Moises Hernandez, with anesthesia, at bedside to assess patient. Dr. Hernandez notified of patient's recheck blood sugar. Please see MAR for insulin administration. Will continue to monitor patient.

## 2019-02-18 NOTE — PLAN OF CARE
Patient is A/Ox4, a little anxious about the upcoming procedure, but was able to sleep after Melatonin was given. Denies SOB, clear LS. VSS on RA. +BS, soft non tender abdomen. Denies pain, denies nausea. NPO post MN for J-tube insertion  today at 0730, casandra wipes done. BG- 159, 146, 154, all covered per sliding scale. TPN running at 60 ml/hr via right chest port. PIV with LR ongoing at 40 ml/hr. +polyuria, uses urinal with minimal assistance. Continue poc.   Vitals:    02/17/19 1610 02/17/19 1938 02/17/19 2217 02/18/19 0403   BP: 146/75 155/90 175/84 151/82   BP Location: Left arm Left arm Left arm Left arm   Pulse: 67 90 74 77   Resp: 16 16 18 16   Temp: 98.8  F (37.1  C) 97.1  F (36.2  C) 95.4  F (35.2  C) 96.7  F (35.9  C)   TempSrc: Oral Oral Oral Oral   SpO2: 95% 95% 98% 98%   Weight:

## 2019-02-18 NOTE — ANESTHESIA POSTPROCEDURE EVALUATION
Anesthesia POST Procedure Evaluation    Patient: Truong Jones   MRN:     [id number] Gender:   male   Age:    84 year old :      3/28/1934        Preoperative Diagnosis: Esophageal Cancer   Procedure(s):  Laparoscopic Jejunostomy Tube Insertion and Esophagogastroduodenoscopy   Postop Comments: No value filed.       Anesthesia Type:  General    Reportable Event: NO     PAIN: Uncomplicated   Sign Out status: Comfortable, Well controlled pain     PONV: No PONV   Sign Out status:  No Nausea or Vomiting     Neuro/Psych: Uneventful perioperative course   Sign Out Status: Preoperative baseline; Age appropriate mentation     Airway/Resp.: Uneventful perioperative course   Sign Out Status: Non labored breathing, age appropriate RR; Resp. Status within EXPECTED Parameters     CV: Uneventful perioperative course   Sign Out status: Appropriate BP and perfusion indices; Appropriate HR/Rhythm     Disposition:   Sign Out in:  PACU  Disposition:  Floor  Recovery Course: Uneventful  Follow-Up: Not required           Last Anesthesia Record Vitals:  CRNA VITALS  2019 0928 - 2019 1028      2019             Pulse:  64    SpO2:  100 %          Last PACU/Preop Vitals:  Vitals:    19 1015 19 1030 19 1045   BP: 166/81 152/83 143/70   Pulse:  61    Resp: 14 14 16   Temp:  36.4  C (97.6  F)    SpO2: 100% 99% 99%         Electronically Signed By: Moises Hernandez MD, 2019, 11:04 AM

## 2019-02-18 NOTE — PROGRESS NOTES
/61 (BP Location: Right arm)   Pulse 59   Temp 97.7  F (36.5  C) (Oral)   Resp 22   Wt 64 kg (141 lb)   SpO2 94%   BMI 25.14 kg/m        At 1355 pt arrived to 7B from PACU, sleepy but easy to wake when called. Oriented x4.  Transferred from: PACU.  Via: cart.  Reason for transfer: Pt's stable after J-tube placement. S/p EGD.  Family: Aware of transfer, daughter at bedside.  Belongings: Sent with pt  Chart: Sent with pt  Report called from: PACU.  He was transferred from the cart to his bed with the hover mat and the assist of three staff. The Capnography is on continuously.  Avss, will continue to monitor.

## 2019-02-18 NOTE — BRIEF OP NOTE
General acute hospital, [place]    Brief Operative Note    Pre-operative diagnosis: Esophageal Cancer  Post-operative diagnosis * No post-op diagnosis entered *  Procedure: Procedure(s):  Laparoscopic Jejunostomy Tube Insertion and Esophagogastroduodenoscopy  Surgeon: Surgeon(s) and Role:     * Jaime Szymanski MD - Primary     * Patrizia Alfrao MD - Assisting     * Cristela Rivera MD - Assisting  Anesthesia: General   Estimated blood loss: Minimal  Drains: 16 fr J tube  Specimens: * No specimens in log *  Findings:   J tube placed ~50 cm distal to the LoT. Obstructing tumor seen at the GEJ on EGD..  Complications: None.  Implants: None.

## 2019-02-18 NOTE — PLAN OF CARE
/75 (BP Location: Left arm)   Pulse 67   Temp 98.8  F (37.1  C) (Oral)   Resp 16   Wt 64 kg (141 lb)   SpO2 95%   BMI 25.14 kg/m       Pt AOx4, VSS, calls appropriately. Has been nausea during shift, PRN zofran x2 and compazine has been given during this shift. Pt c/o of pain when he swallows and requested tylenol but in liquid form. Tylenol 650mg administered. Had a speech evaluation today and TF was recommended. Has a right chest port which TPN 55ml/hr is being administered. TPN rate will be change at 2000 to 60ml/hr. LR at 40ml/hr via PIV. SBA with urinal/commode, wear adult briefs from home. TF surgery will be done 2/18/19, currently NPO. Continue to follow POC

## 2019-02-18 NOTE — ANESTHESIA CARE TRANSFER NOTE
Patient: Truong Jones    Procedure(s):  Laparoscopic Jejunostomy Tube Insertion and Esophagogastroduodenoscopy    Diagnosis: Esophageal Cancer  Diagnosis Additional Information: No value filed.    Anesthesia Type:   No value filed.     Note:  Airway :Face Mask  Patient transferred to:PACU  Comments: Patient resting comfortably, breathing spontaneously.  James Grimes  Handoff Report: Identifed the Patient, Identified the Reponsible Provider, Reviewed the pertinent medical history, Discussed the surgical course, Reviewed Intra-OP anesthesia mangement and issues during anesthesia, Set expectations for post-procedure period and Allowed opportunity for questions and acknowledgement of understanding      Vitals: (Last set prior to Anesthesia Care Transfer)    CRNA VITALS  2/18/2019 0928 - 2/18/2019 1007      2/18/2019             Pulse:  64    SpO2:  100 %                Electronically Signed By: James Grimes MD  February 18, 2019  10:07 AM

## 2019-02-18 NOTE — ANESTHESIA PREPROCEDURE EVALUATION
Anesthesia Pre-Procedure Evaluation    Patient: Truong Jones   MRN:     [id number] Gender:   male   Age:    84 year old :      3/28/1934        Preoperative Diagnosis: Esophageal Cancer   Procedure(s):  Laparoscopic Jejunostomy Tube Insertion     Past Medical History:   Diagnosis Date     Personal history of prostate cancer 2018     Polymyalgia rheumatica (H) 2018      No past surgical history on file.       Anesthesia Evaluation     .             ROS/MED HX    ENT/Pulmonary:  - neg pulmonary ROS     Neurologic:       Cardiovascular:     (+) Dyslipidemia, hypertension----. : . . . :. .       METS/Exercise Tolerance:     Hematologic: Comments: thrombocytopenia        Musculoskeletal: Comment: Polymyalgia rheumatica         GI/Hepatic: Comment: Esophageal cancer s/p chemoradiation    (+) GERD Asymptomatic on medication,       Renal/Genitourinary:         Endo:         Psychiatric:         Infectious Disease:         Malignancy:   (+) Malignancy History of Prostate  Prostate CA Remission status post,         Other:                         PHYSICAL EXAM:   Mental Status/Neuro:    Airway:   Mallampati: II  Mouth/Opening: Full  TM distance: > 6 cm  Neck ROM: Full   Respiratory: Auscultation: CTAB     Resp. Rate: Normal     Resp. Effort: Normal      CV: Rhythm: Regular  Heart: Normal Sounds   Comments:      Dental:  Dentures: Upper; Lower                  Lab Results   Component Value Date    WBC 3.3 (L) 2019    HGB 9.3 (L) 2019    HCT 27.6 (L) 2019    PLT 54 (L) 2019     2019    POTASSIUM 3.5 2019    CHLORIDE 107 2019    CO2 27 2019    BUN 16 2019    CR 0.61 (L) 2019     (H) 2019    JAZMINE 7.9 (L) 2019    PHOS 3.3 2019    MAG 2.1 2019    ALBUMIN 2.7 (L) 2019    PROTTOTAL 5.4 (L) 2019    ALT 15 2019    AST 20 2019    ALKPHOS 70 2019    BILITOTAL 0.5 2019    INR 1.19 (H)  "02/17/2019       Preop Vitals  BP Readings from Last 3 Encounters:   02/17/19 146/75   12/18/18 123/76    Pulse Readings from Last 3 Encounters:   02/17/19 67   12/18/18 90      Resp Readings from Last 3 Encounters:   02/17/19 16    SpO2 Readings from Last 3 Encounters:   02/17/19 95%   12/18/18 97%      Temp Readings from Last 1 Encounters:   02/17/19 37.1  C (98.8  F) (Oral)    Ht Readings from Last 1 Encounters:   12/18/18 1.595 m (5' 2.8\")      Wt Readings from Last 1 Encounters:   02/16/19 64 kg (141 lb)    Estimated body mass index is 25.14 kg/m  as calculated from the following:    Height as of 12/18/18: 1.595 m (5' 2.8\").    Weight as of 2/16/19: 64 kg (141 lb).     LDA:  Peripheral IV 02/16/19 Left Lower forearm (Active)   Site Assessment [facility] 2/17/2019  1:00 AM   Line Status Saline locked 2/17/2019  1:00 AM   Phlebitis Scale 0-->no symptoms 2/17/2019  1:00 AM   Infiltration Scale 0 2/17/2019  1:00 AM   Number of days: 1       Port A Cath Single 01/04/19 Right Chest wall (Active)   Access Date 02/16/19 2/16/2019 10:55 PM   Access Attempts 2 2/16/2019 10:55 PM   Gauge/Length 20 gauge;3/4 inch 2/16/2019 10:55 PM   Site Assessment [facility] 2/16/2019 10:55 PM   Line Status Blood return noted;Saline locked 2/16/2019 10:55 PM   Dressing change due 02/23/19 2/16/2019 10:55 PM   Needle Change Due 02/23/19 2/16/2019 10:55 PM   Number of days: 44            Assessment:   ASA SCORE: 3       Documentation: H&P complete; Preop Testing complete; Consents complete   Proceeding: Proceed without further delay  Tobacco Use:  NO Active use of Tobacco/UNKNOWN Tobacco use status     Plan:   Anes. Type:  General      Induction:  IV (RSI)   Airway: Oral ETT   Access/Monitoring: PIV   Maintenance: Balanced   Emergence: Procedure Site   Logistics: Observation/Admission     Postop Pain/Sedation Strategy:  Standard-Options: Opioids PRN     PONV Management:  Adult Risk Factors:, Non-Smoker, Postop Opioids  Prevention: Ondansetron; " Dexamethasone     CONSENT: Direct conversation   Plan and risks discussed with: Patient   Blood Products: Consent Deferred (Minimal Blood Loss)                         Moises Hernandez MD

## 2019-02-19 ENCOUNTER — APPOINTMENT (OUTPATIENT)
Dept: OCCUPATIONAL THERAPY | Facility: CLINIC | Age: 84
DRG: 374 | End: 2019-02-19
Attending: THORACIC SURGERY (CARDIOTHORACIC VASCULAR SURGERY)
Payer: MEDICARE

## 2019-02-19 ENCOUNTER — APPOINTMENT (OUTPATIENT)
Dept: PHYSICAL THERAPY | Facility: CLINIC | Age: 84
DRG: 374 | End: 2019-02-19
Attending: THORACIC SURGERY (CARDIOTHORACIC VASCULAR SURGERY)
Payer: MEDICARE

## 2019-02-19 LAB
ANION GAP SERPL CALCULATED.3IONS-SCNC: 7 MMOL/L (ref 3–14)
BUN SERPL-MCNC: 28 MG/DL (ref 7–30)
CALCIUM SERPL-MCNC: 7.8 MG/DL (ref 8.5–10.1)
CHLORIDE SERPL-SCNC: 108 MMOL/L (ref 94–109)
CO2 SERPL-SCNC: 23 MMOL/L (ref 20–32)
CREAT SERPL-MCNC: 0.66 MG/DL (ref 0.66–1.25)
ERYTHROCYTE [DISTWIDTH] IN BLOOD BY AUTOMATED COUNT: 17.3 % (ref 10–15)
GFR SERPL CREATININE-BSD FRML MDRD: 88 ML/MIN/{1.73_M2}
GLUCOSE BLDC GLUCOMTR-MCNC: 153 MG/DL (ref 70–99)
GLUCOSE BLDC GLUCOMTR-MCNC: 193 MG/DL (ref 70–99)
GLUCOSE BLDC GLUCOMTR-MCNC: 195 MG/DL (ref 70–99)
GLUCOSE BLDC GLUCOMTR-MCNC: 239 MG/DL (ref 70–99)
GLUCOSE BLDC GLUCOMTR-MCNC: 243 MG/DL (ref 70–99)
GLUCOSE SERPL-MCNC: 210 MG/DL (ref 70–99)
HCT VFR BLD AUTO: 26.2 % (ref 40–53)
HGB BLD-MCNC: 8.5 G/DL (ref 13.3–17.7)
MAGNESIUM SERPL-MCNC: 2.3 MG/DL (ref 1.6–2.3)
MCH RBC QN AUTO: 30.8 PG (ref 26.5–33)
MCHC RBC AUTO-ENTMCNC: 32.4 G/DL (ref 31.5–36.5)
MCV RBC AUTO: 95 FL (ref 78–100)
PHOSPHATE SERPL-MCNC: 3.1 MG/DL (ref 2.5–4.5)
PLATELET # BLD AUTO: 67 10E9/L (ref 150–450)
POTASSIUM SERPL-SCNC: 4.5 MMOL/L (ref 3.4–5.3)
POTASSIUM SERPL-SCNC: 4.8 MMOL/L (ref 3.4–5.3)
RBC # BLD AUTO: 2.76 10E12/L (ref 4.4–5.9)
SODIUM SERPL-SCNC: 138 MMOL/L (ref 133–144)
WBC # BLD AUTO: 5.4 10E9/L (ref 4–11)

## 2019-02-19 PROCEDURE — 27210429 ZZH NUTRITION PRODUCT INTERMEDIATE LITER

## 2019-02-19 PROCEDURE — 25000132 ZZH RX MED GY IP 250 OP 250 PS 637: Mod: GY | Performed by: STUDENT IN AN ORGANIZED HEALTH CARE EDUCATION/TRAINING PROGRAM

## 2019-02-19 PROCEDURE — 83735 ASSAY OF MAGNESIUM: CPT | Performed by: THORACIC SURGERY (CARDIOTHORACIC VASCULAR SURGERY)

## 2019-02-19 PROCEDURE — 25000128 H RX IP 250 OP 636: Performed by: STUDENT IN AN ORGANIZED HEALTH CARE EDUCATION/TRAINING PROGRAM

## 2019-02-19 PROCEDURE — A9270 NON-COVERED ITEM OR SERVICE: HCPCS | Mod: GY | Performed by: SURGERY

## 2019-02-19 PROCEDURE — 00000146 ZZHCL STATISTIC GLUCOSE BY METER IP

## 2019-02-19 PROCEDURE — 97535 SELF CARE MNGMENT TRAINING: CPT | Mod: GO | Performed by: OCCUPATIONAL THERAPIST

## 2019-02-19 PROCEDURE — 97530 THERAPEUTIC ACTIVITIES: CPT | Mod: GP

## 2019-02-19 PROCEDURE — 25000125 ZZHC RX 250: Performed by: THORACIC SURGERY (CARDIOTHORACIC VASCULAR SURGERY)

## 2019-02-19 PROCEDURE — 97116 GAIT TRAINING THERAPY: CPT | Mod: GP

## 2019-02-19 PROCEDURE — A9270 NON-COVERED ITEM OR SERVICE: HCPCS | Mod: GY | Performed by: STUDENT IN AN ORGANIZED HEALTH CARE EDUCATION/TRAINING PROGRAM

## 2019-02-19 PROCEDURE — 80048 BASIC METABOLIC PNL TOTAL CA: CPT | Performed by: THORACIC SURGERY (CARDIOTHORACIC VASCULAR SURGERY)

## 2019-02-19 PROCEDURE — 85027 COMPLETE CBC AUTOMATED: CPT | Performed by: THORACIC SURGERY (CARDIOTHORACIC VASCULAR SURGERY)

## 2019-02-19 PROCEDURE — 12000001 ZZH R&B MED SURG/OB UMMC

## 2019-02-19 PROCEDURE — 97165 OT EVAL LOW COMPLEX 30 MIN: CPT | Mod: GO | Performed by: OCCUPATIONAL THERAPIST

## 2019-02-19 PROCEDURE — 36592 COLLECT BLOOD FROM PICC: CPT | Performed by: THORACIC SURGERY (CARDIOTHORACIC VASCULAR SURGERY)

## 2019-02-19 PROCEDURE — 84100 ASSAY OF PHOSPHORUS: CPT | Performed by: THORACIC SURGERY (CARDIOTHORACIC VASCULAR SURGERY)

## 2019-02-19 PROCEDURE — 25000132 ZZH RX MED GY IP 250 OP 250 PS 637: Mod: GY | Performed by: SURGERY

## 2019-02-19 PROCEDURE — 36415 COLL VENOUS BLD VENIPUNCTURE: CPT | Performed by: THORACIC SURGERY (CARDIOTHORACIC VASCULAR SURGERY)

## 2019-02-19 PROCEDURE — 97162 PT EVAL MOD COMPLEX 30 MIN: CPT | Mod: GP

## 2019-02-19 PROCEDURE — 84132 ASSAY OF SERUM POTASSIUM: CPT | Performed by: THORACIC SURGERY (CARDIOTHORACIC VASCULAR SURGERY)

## 2019-02-19 RX ADMIN — SENNOSIDES AND DOCUSATE SODIUM 1 TABLET: 8.6; 5 TABLET ORAL at 19:31

## 2019-02-19 RX ADMIN — ACETAMINOPHEN 975 MG: 325 SOLUTION ORAL at 03:04

## 2019-02-19 RX ADMIN — MULTIVITAMIN 15 ML: LIQUID ORAL at 10:05

## 2019-02-19 RX ADMIN — OXYCODONE HYDROCHLORIDE 5 MG: 5 SOLUTION ORAL at 19:02

## 2019-02-19 RX ADMIN — INSULIN ASPART 3 UNITS: 100 INJECTION, SOLUTION INTRAVENOUS; SUBCUTANEOUS at 04:49

## 2019-02-19 RX ADMIN — SENNOSIDES AND DOCUSATE SODIUM 1 TABLET: 8.6; 5 TABLET ORAL at 08:57

## 2019-02-19 RX ADMIN — INSULIN ASPART 2 UNITS: 100 INJECTION, SOLUTION INTRAVENOUS; SUBCUTANEOUS at 19:28

## 2019-02-19 RX ADMIN — OXYCODONE HYDROCHLORIDE 5 MG: 5 SOLUTION ORAL at 04:53

## 2019-02-19 RX ADMIN — INSULIN ASPART 1 UNITS: 100 INJECTION, SOLUTION INTRAVENOUS; SUBCUTANEOUS at 23:57

## 2019-02-19 RX ADMIN — FAMOTIDINE 20 MG: 20 INJECTION, SOLUTION INTRAVENOUS at 20:44

## 2019-02-19 RX ADMIN — FAMOTIDINE 20 MG: 20 INJECTION, SOLUTION INTRAVENOUS at 08:59

## 2019-02-19 RX ADMIN — AMLODIPINE BESYLATE 2.5 MG: 2.5 TABLET ORAL at 08:56

## 2019-02-19 RX ADMIN — INSULIN ASPART 1 UNITS: 100 INJECTION, SOLUTION INTRAVENOUS; SUBCUTANEOUS at 11:39

## 2019-02-19 RX ADMIN — INSULIN ASPART 2 UNITS: 100 INJECTION, SOLUTION INTRAVENOUS; SUBCUTANEOUS at 08:51

## 2019-02-19 RX ADMIN — ESCITALOPRAM OXALATE 20 MG: 10 TABLET ORAL at 08:53

## 2019-02-19 RX ADMIN — SUCRALFATE 1 G: 1 SUSPENSION ORAL at 19:31

## 2019-02-19 RX ADMIN — INSULIN ASPART 2 UNITS: 100 INJECTION, SOLUTION INTRAVENOUS; SUBCUTANEOUS at 00:05

## 2019-02-19 RX ADMIN — POTASSIUM CHLORIDE: 2 INJECTION, SOLUTION, CONCENTRATE INTRAVENOUS at 19:33

## 2019-02-19 RX ADMIN — ACETAMINOPHEN 975 MG: 325 SOLUTION ORAL at 19:30

## 2019-02-19 RX ADMIN — SUCRALFATE 1 G: 1 SUSPENSION ORAL at 08:59

## 2019-02-19 RX ADMIN — POLYETHYLENE GLYCOL 3350 17 G: 17 POWDER, FOR SOLUTION ORAL at 08:59

## 2019-02-19 RX ADMIN — OXYCODONE HYDROCHLORIDE 5 MG: 5 SOLUTION ORAL at 00:07

## 2019-02-19 RX ADMIN — SUCRALFATE 1 G: 1 SUSPENSION ORAL at 14:50

## 2019-02-19 RX ADMIN — INSULIN ASPART 2 UNITS: 100 INJECTION, SOLUTION INTRAVENOUS; SUBCUTANEOUS at 16:49

## 2019-02-19 RX ADMIN — ACETAMINOPHEN 975 MG: 325 SOLUTION ORAL at 11:44

## 2019-02-19 ASSESSMENT — ACTIVITIES OF DAILY LIVING (ADL)
ADLS_ACUITY_SCORE: 17
ADLS_ACUITY_SCORE: 16
ADLS_ACUITY_SCORE: 17
ADLS_ACUITY_SCORE: 16
ADLS_ACUITY_SCORE: 17
ADLS_ACUITY_SCORE: 17

## 2019-02-19 NOTE — PROGRESS NOTES
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 Pt is to have further surgery at [facility] with Dr. Szymanski.   Pt has TCU recommendations currently. SW spoke with PT staff and they asked SW to wait to make TCU referrals until Wednesday after they can see pt and get more data.   Per RNCC Ladonna, she saw pt today and was informed that either pt's daughter or granddaughter will be at home with pt all the time and they work opposite schedules. Pt is going to [facility] Wednesday for tube feed teaching. Per RNCC pt is agreeable to discharge to home if he is strong enough and is not opposed to TCU if this is recommended/needed.   Per chart pt could be ready for discharge Thursday.     Intervention: SW attempted to meet with pt to introduce self and discuss discharge planning but pt was working with OT. PITER will plan to meet with pt Wednesday.     Assessment: See bedside RN, PT/OT, medical team notes  Plan:    Discharge Plans in Progress: TCU vs home    Barriers to d/c plan: Medical stability, unclear at this time if pt will indeed need TCU when ready for discharge.     Follow up Plan: PITER SCHRADER will continue to follow as appropriate    Tavia Sharma, LIDIA, MSW  7B   [phone] (pager) [phone]  2/19/2019

## 2019-02-19 NOTE — PROGRESS NOTES
02/19/19 1000   Quick Adds   Type of Visit Initial PT Evaluation   Living Environment   Lives With child(rayray), adult;grandchild(rayray)   Living Arrangements house  ([facility])   Home Accessibility stairs to enter home   Number of Stairs, Main Entrance (9+9)   Stair Railings, Main Entrance railings safe and in good condition   Transportation Anticipated family or friend will provide  (daughter/granddaughter drive him)   Self-Care   Usual Activity Tolerance good   Current Activity Tolerance moderate   Regular Exercise Yes   Activity/Exercise Type walking   Exercise Amount/Frequency 3-5 times/wk  (3x/week 30 min)   Equipment Currently Used at Home cane, straight;walker, rolling   Activity/Exercise/Self-Care Comment Retired, likes to read, former violinist   Functional Level Prior   Ambulation 1-->assistive equipment   Transferring 1-->assistive equipment   Fall history within last six months no   Which of the above functional risks had a recent onset or change? ambulation  (activity tolerance)   General Information   Onset of Illness/Injury or Date of Surgery - Date 02/16/19   Patient/Family Goals Statement Return home to [facility]   Pertinent History of Current Problem (include personal factors and/or comorbidities that impact the POC) Truong Jones is a 84 year old male POD#1 s/p laparoscopic jejunostomy tube insertion    General Observations Well-appearing male seated in chair.   Cognitive Status Examination   Orientation orientation to person, place and time   Level of Consciousness alert   Follows Commands and Answers Questions 100% of the time   Personal Safety and Judgment intact   Memory intact   Pain Assessment   Patient Currently in Pain No   Integumentary/Edema   Integumentary/Edema no deficits were identifed   Posture    Posture Forward head position;Protracted shoulders   Range of Motion (ROM)   ROM Comment Grossly WNL in all extremities   Strength   Strength Comments Grossly 3+/5 or greater in all  extremities   Bed Mobility   Bed Mobility Comments Sit-supine with use of UEs and CGA   Transfer Skills   Transfer Comments Sit-stand with use of UEs and CGA   Gait   Gait Comments Ambulates ~200 ft with rolling walker and CGA, 1 rest break required, slow pace   Balance   Balance Comments CGA for standing static/dynamic balance.   Sensory Examination   Sensory Perception no deficits were identified   General Therapy Interventions   Planned Therapy Interventions gait training;strengthening;progressive activity/exercise   Clinical Impression   Criteria for Skilled Therapeutic Intervention yes, treatment indicated   PT Diagnosis Impaired functional mobility, impaired endurance   Influenced by the following impairments Medical, deconditioning   Functional limitations due to impairments Ambulation, participation in all activities   Clinical Presentation Evolving/Changing   Clinical Presentation Rationale Medical needs, tube feeding, deconditioning   Clinical Decision Making (Complexity) Moderate complexity   Therapy Frequency` 5 times/week   Predicted Duration of Therapy Intervention (days/wks) 1 week   Anticipated Equipment Needs at Discharge walker   Anticipated Discharge Disposition Transitional Care Facility   Risk & Benefits of therapy have been explained Yes   Patient, Family & other staff in agreement with plan of care Yes   Total Evaluation Time   Total Evaluation Time (Minutes) 10

## 2019-02-19 NOTE — PLAN OF CARE
Discharge Planner PT   Patient plan for discharge: TCU  Current status: Ambulates ~200 ft with rolling walker and CGA, requires 1 rest break. O2 remains at 95% on room air. Sit-stand with UEs and SBA, sit-supine with Aaron to reposition in bed.  Barriers to return to prior living situation: Medical, impaired functional mobility  Recommendations for discharge: TCU  Rationale for recommendations: Pt would benefit from continued therapy to address activity limitations.       Entered by: Gee Fitzgerald 02/19/2019 12:36 PM

## 2019-02-19 NOTE — PLAN OF CARE
/73 (BP Location: Left arm)   Pulse 59   Temp 96.1  F (35.6  C) (Oral)   Resp 19   Wt 64 kg (141 lb)   SpO2 97%   BMI 25.14 kg/m     Patient reports pain is tolerable with Tylenol.  Abdomen soft with bowel sounds present, patient is passing gas and has had a BM.  Abdominal lap sites C/D/I.  J- tube intact with tube feeding infusing at 20ml/hr. Up with assist of one and walker. TPN infusing at 60 ml/hr.  Patient learning center at 4pm tomorrow.  Tolerating clear liquid diet.  Continue with POC.

## 2019-02-19 NOTE — PROGRESS NOTES
Post Op Check    02/19/2019    Truong Jones is a 84 year old male with h/o esophageal cancer now POD#0 s/p laparoscopic jejunostomy tube insertion and esophagogastroduodenoscopy.    Pt reports that pain is well controlled with PO medications. Denies SOB, chest pain, nausea, vomiting, fevers, or chills. Voiding adequately.    /60   Pulse 59   Temp 98.8  F (37.1  C) (Oral)   Resp 14   Wt 64 kg (141 lb)   SpO2 97%   BMI 25.14 kg/m      Gen: A&O x3, NAD. Sleeping on entry but wakes for exam. Hard of hearing.   Chest: breathing non-labored on room air.  Abdomen: soft, non-tender, non-distended.  Incision: clean, dry, intact. J tube with minimal serosanguineous fluid visible in tubing. No erythema or drainage around J tube site.  Extremities: warm and well perfused. Peripheral pulses intact.    I/O last 3 completed shifts:  In: 1240 [I.V.:640]  Out: 950 [Urine:950]    A/P: No acute post-op issues. Continue plan of care per primary team. Please call with any questions.    Renea Bear MD  General Surgery PGY-1

## 2019-02-19 NOTE — PROGRESS NOTES
Clinical Nutrition Services- Brief Note     Nutrition Prescription    RECOMMENDATIONS FOR MDs/PROVIDERS TO ORDER:  1. Consider endocrinology consult if BG is difficult to manage. Will need increase in insulin given carbohydrates from EN in addition to PN during transition/weaning    2. rec to discontinue TPN tomorrow if tolerates TF advancement today    3. Pt minimal refeeding risk given maintenance on TPN for nutrition prior to enteral start    Recommendations already ordered by Registered Dietitian (RD):  1. TPN via central line - 1440 mL,180 g Dextrose (612 kcal), 80 g AA (320 kcal) and no lipids. Will provide 932 kcal/day (15 kcal/kg/day), 1.3 g PRO kg/day, GIR 1.95.  --Use dosing weight 64 kg  -- no infuvite/MTE5 (given w/ EN)    2. Via j-tube: Start feeds of Isosource 1.5 at 20 mL/hr. Advance by 10 mL q4h to goal of 50 mL/hr as tolerated.  --Isosource @ 50 mL/hr to provide:  (1200 ml/day) to provide 1800 kcals (28 kcal/kg/day), 82 g PRO (1.3 g/kg/day), 912 ml free H2O, 211 g CHO and 18 g Fiber daily.  --30 mL q4h FWF for tube patency while receiving TPN volume  -certavite    Future/Additional Recommendations:  When ready to cycle TF if tolerates goal continuous feeds rec:  -initially Isosource 1.5 @ 65 mL/hr x 18 hours (9PM-3PM or as pt prefers)  -->60 mL FWF before and after cyclic feeds. Needs additional 750 mL FWF for hydration. Consider 125 mL q4h FWF or schedule as needed to better fit pt schedule    -if tolerates over 18 hours further titrate to isosource 1.5 @ 100 mL/hr x 12 hours (9PM-9AM or as pt prefers)  -->60 mL FWF before and after cyclic feeds. Needs additional 750 mL FWF for hydration. Consider 125 mL q4h FWF or schedule as needed to better fit pt schedule     *See full assessment 2/17/19 for additional details  Provider Consult - Registered Dietitian to Assess and Order TF per Medical Nutrition Therapy Recommendations, Cachectic / malnutrition    New Findings  General:  Discussion with  thoracics PA - plan to continue TPN at goal today (ok to discontinue lipids) while working on titrating TF. Plan to get to goal TF rate today, then if tolerated, advance to goal rate tomorrow in preparation for discharge. Diet is for pleasure/comfort.    Procedures:  2/18 - lap jejunostomy tube insertion (placed ~50 cm distal to the LoT)    Labs:  Lytes WNL  Blood Glucose elevated 200's    Implementation  1. Collaboration with other providers - 7B rounds, discussion with thoracics PA, discussion with PharmD on TPN and glucose control  2. Parenteral Nutrition - change order sent to discontinue lipids and infuvite/MTE5  3. Enteral Nutrition - initiate (see above)     Monitoring/Evaluation  Progress toward goals will be monitored and evaluated per protocol.    Elizabeth Lopez RD, , LD.  7B and 5A (Beds 5201-1 to 5911-2) Pager: [phone]

## 2019-02-19 NOTE — PLAN OF CARE
Discharge Planner OT   Patient plan for discharge: Open to TCU  Current status: Pt mod I supine>sit, requiring assist for line mgmt only at start of session. Pt then requiring max Ax2 for boosting up in bed at end of session. Pt STS and ambulation with FWW in-room distances CGA-SBA. Pt tolerating approx. 7 mins of standing G/H without seated rest break. Pt doffs socks mod I but required total assist to don 2/2 pain. Pt cognition requires further assessment, as pt mistaking hosptial staff for family members and later having difficulty categorizing sessions with care providers. Pt scoring 22/30 on MOCA with normal cognitive function scoring 26 and greater.    Barriers to return to prior living situation: deconditioning, post-surgical precautions, cognition requires further assessment   Recommendations for discharge: TCU  Rationale for recommendations: Pt below baseline for ADLs/IADLs and functional mobility. Pt will benefit from cont. therapies to increase safety and I with daily activities.        Entered by: Mag Benitez 02/19/2019 3:00 PM

## 2019-02-19 NOTE — PROGRESS NOTES
Care Coordinator Progress Note    Admission Date/Time:  2/16/2019  Attending MD:  Jaime Szymanski MD    Data  Chart reviewed, discussed with interdisciplinary team.   Patient was admitted for: Data Unavailable.    Concerns with insurance coverage for discharge needs: None.  Current Living Situation: Patient lives with family.  Support System: Supportive and Involved  Services Involved: no services available prior to admission  Transportation at Discharge: TBD  Transportation to Medical Appointments:   - Name of caregiver: Grandsaranughter, Cinthia, or Daughter, Miryam  Barriers to Discharge: medical clearance    Coordination of Care and Referrals: Provided patient/family with options for Home Care and Home Infusion.        Assessment  Patient is an 84 year old male with esophageal cancer who is s/p lap J tube insertion and plan to start tube feedings.  Patient was a transfer from [facility].  At [facility] a referral to [facility](P: [phone], F: [phone]) was made for home tube feeding support.  Met with patient at bedside to introduce RNCC role and discuss discharge planning.  PT currently recommending TCU.  Patient agreeable to whatever is recommended.  Patient lives with his daughter, Miryam, and granddaughter, Cinthia.  They would be available to help him after discharge.  PLC class for enteral tube teaching scheduled for tomorrow at 4pm, patient would like his daughter and granddaughter to attend with him if able.  If patient discharges to home he would be interested in home care for nursing and PT/OT.  He does not have a preference of home care agency.  RNCC will continue to follow and assist with discharge planning as needed.       Plan  Anticipated Discharge Date:  2-3 days  Anticipated Discharge Plan:  TCU vs home with home infusion and home care    Ladonna Balderrama RN

## 2019-02-19 NOTE — PLAN OF CARE
Patient is A/Ox4, looks weak and tired. S/p J-tube placement yesterday. Denies SOB, clear LS. VSS on RA. +BS, 3 lap sites cdi, derma bonded. J-tube in tact, clamped. BG- 239 and 243, covered per sliding scale. TPN running at 60 ml/hr with Lipids at 20.8 via right chest port. Potassium rep completed, recheck- 4.8, resumed LR at 40 ml/hr.  Incision pain is managed with Oxycodone 5 mg po given 2x with scheduled Tylenol. Declined to get OOB, changed pad in bed 1x. Continue poc.   Vitals:    02/18/19 2100 02/18/19 2200 02/19/19 0000 02/19/19 0444   BP: 145/74 136/60  156/71   BP Location:    Left arm   Pulse:       Resp: 20 20 14 26   Temp: 98.8  F (37.1  C)   98.1  F (36.7  C)   TempSrc: Oral   Oral   SpO2: 95% 95% 97% 97%   Weight:

## 2019-02-19 NOTE — PLAN OF CARE
/60   Pulse 59   Temp 98.8  F (37.1  C) (Oral)   Resp 20   Wt 64 kg (141 lb)   SpO2 95%   BMI 25.14 kg/m        Neuro: A&Ox4.   Cardiac: SR. VSS.   Respiratory: Sating 95% on RA.  GI/: No BM.Adequate urine output vial urinal.  Diet/appetite: Pt decline oral intake.  Activity:  Turned and repositioned every two hours.  Pain: At acceptable level on current regimen.   Skin: No new deficits noted.  LDA's:PIV x2 and port a cath infusing TPN/LIPID. The potassium was 3.3 and it was replaced with 40 meq of Potassium, recheck scheduled for 0130. The new J-tube is clean and dry(clamped).  Plan: Continue with POC. Notify primary team with changes.

## 2019-02-19 NOTE — PROGRESS NOTES
THORACIC & FOREGUT SURGERY    S:  No acute overnight events.  Pt seen at bedside resting comfortably.       O:  Vitals:    02/18/19 2200 02/19/19 0000 02/19/19 0444 02/19/19 0824   BP: 136/60  156/71 142/73   BP Location:   Left arm Left arm   Pulse:       Resp: 20 14 26 19   Temp:   98.1  F (36.7  C) 96.1  F (35.6  C)   TempSrc:   Oral Oral   SpO2: 95% 97% 97% 97%   Weight:         A&O, NAD  Breathing non-labored  Soft, NDNT, J tube site healthy appearing  Distal extremities warm    A/P: Truong Jones is a 84 year old male POD#1 s/p laparoscopic jejunostomy tube insertion   -Start TFs today and advance to goal   -Plan to cycle TFs tomorrow PM  -Anna dispo in 2 days  -Lovenox held due to thrombocytopenia    Jeremiah Grady PA-C  Thoracic Surgery

## 2019-02-19 NOTE — PHARMACY-CONSULT NOTE
Pharmacy Tube Feeding Consult    Medication reviewed for administration by feeding tube and for potential food/drug interactions.    Recommendation: No changes are needed at this time.     Pharmacy will continue to follow as new medications are ordered.     Cira Saldaña, PharmD   Pager: [phone] (text capable)    February 19, 2019

## 2019-02-20 ENCOUNTER — APPOINTMENT (OUTPATIENT)
Dept: OCCUPATIONAL THERAPY | Facility: CLINIC | Age: 84
DRG: 374 | End: 2019-02-20
Attending: THORACIC SURGERY (CARDIOTHORACIC VASCULAR SURGERY)
Payer: MEDICARE

## 2019-02-20 LAB
ANION GAP SERPL CALCULATED.3IONS-SCNC: 6 MMOL/L (ref 3–14)
BUN SERPL-MCNC: 26 MG/DL (ref 7–30)
CALCIUM SERPL-MCNC: 7.8 MG/DL (ref 8.5–10.1)
CHLORIDE SERPL-SCNC: 110 MMOL/L (ref 94–109)
CO2 SERPL-SCNC: 23 MMOL/L (ref 20–32)
CREAT SERPL-MCNC: 0.68 MG/DL (ref 0.66–1.25)
ERYTHROCYTE [DISTWIDTH] IN BLOOD BY AUTOMATED COUNT: 18.2 % (ref 10–15)
GFR SERPL CREATININE-BSD FRML MDRD: 87 ML/MIN/{1.73_M2}
GLUCOSE BLDC GLUCOMTR-MCNC: 110 MG/DL (ref 70–99)
GLUCOSE BLDC GLUCOMTR-MCNC: 112 MG/DL (ref 70–99)
GLUCOSE BLDC GLUCOMTR-MCNC: 122 MG/DL (ref 70–99)
GLUCOSE BLDC GLUCOMTR-MCNC: 177 MG/DL (ref 70–99)
GLUCOSE BLDC GLUCOMTR-MCNC: 185 MG/DL (ref 70–99)
GLUCOSE BLDC GLUCOMTR-MCNC: 217 MG/DL (ref 70–99)
GLUCOSE BLDC GLUCOMTR-MCNC: 217 MG/DL (ref 70–99)
GLUCOSE SERPL-MCNC: 262 MG/DL (ref 70–99)
HCT VFR BLD AUTO: 28.4 % (ref 40–53)
HGB BLD-MCNC: 8.9 G/DL (ref 13.3–17.7)
MAGNESIUM SERPL-MCNC: 2.1 MG/DL (ref 1.6–2.3)
MCH RBC QN AUTO: 30.3 PG (ref 26.5–33)
MCHC RBC AUTO-ENTMCNC: 31.3 G/DL (ref 31.5–36.5)
MCV RBC AUTO: 97 FL (ref 78–100)
PHOSPHATE SERPL-MCNC: 3.3 MG/DL (ref 2.5–4.5)
PLATELET # BLD AUTO: 63 10E9/L (ref 150–450)
POTASSIUM SERPL-SCNC: 4.5 MMOL/L (ref 3.4–5.3)
RBC # BLD AUTO: 2.94 10E12/L (ref 4.4–5.9)
SODIUM SERPL-SCNC: 139 MMOL/L (ref 133–144)
WBC # BLD AUTO: 3.5 10E9/L (ref 4–11)

## 2019-02-20 PROCEDURE — 25000128 H RX IP 250 OP 636: Performed by: STUDENT IN AN ORGANIZED HEALTH CARE EDUCATION/TRAINING PROGRAM

## 2019-02-20 PROCEDURE — 12000001 ZZH R&B MED SURG/OB UMMC

## 2019-02-20 PROCEDURE — 25000132 ZZH RX MED GY IP 250 OP 250 PS 637: Mod: GY | Performed by: STUDENT IN AN ORGANIZED HEALTH CARE EDUCATION/TRAINING PROGRAM

## 2019-02-20 PROCEDURE — 80048 BASIC METABOLIC PNL TOTAL CA: CPT | Performed by: THORACIC SURGERY (CARDIOTHORACIC VASCULAR SURGERY)

## 2019-02-20 PROCEDURE — 83735 ASSAY OF MAGNESIUM: CPT | Performed by: THORACIC SURGERY (CARDIOTHORACIC VASCULAR SURGERY)

## 2019-02-20 PROCEDURE — A9270 NON-COVERED ITEM OR SERVICE: HCPCS | Mod: GY | Performed by: STUDENT IN AN ORGANIZED HEALTH CARE EDUCATION/TRAINING PROGRAM

## 2019-02-20 PROCEDURE — 25000132 ZZH RX MED GY IP 250 OP 250 PS 637: Mod: GY | Performed by: SURGERY

## 2019-02-20 PROCEDURE — A9270 NON-COVERED ITEM OR SERVICE: HCPCS | Mod: GY | Performed by: SURGERY

## 2019-02-20 PROCEDURE — 85027 COMPLETE CBC AUTOMATED: CPT | Performed by: THORACIC SURGERY (CARDIOTHORACIC VASCULAR SURGERY)

## 2019-02-20 PROCEDURE — 84100 ASSAY OF PHOSPHORUS: CPT | Performed by: THORACIC SURGERY (CARDIOTHORACIC VASCULAR SURGERY)

## 2019-02-20 PROCEDURE — 00000146 ZZHCL STATISTIC GLUCOSE BY METER IP

## 2019-02-20 PROCEDURE — 97535 SELF CARE MNGMENT TRAINING: CPT | Mod: GO | Performed by: OCCUPATIONAL THERAPIST

## 2019-02-20 PROCEDURE — 36592 COLLECT BLOOD FROM PICC: CPT | Performed by: THORACIC SURGERY (CARDIOTHORACIC VASCULAR SURGERY)

## 2019-02-20 PROCEDURE — 27210429 ZZH NUTRITION PRODUCT INTERMEDIATE LITER

## 2019-02-20 RX ORDER — LISINOPRIL 20 MG/1
20 TABLET ORAL DAILY
Status: DISCONTINUED | OUTPATIENT
Start: 2019-02-20 | End: 2019-02-22 | Stop reason: HOSPADM

## 2019-02-20 RX ADMIN — OXYCODONE HYDROCHLORIDE 5 MG: 5 SOLUTION ORAL at 00:05

## 2019-02-20 RX ADMIN — FAMOTIDINE 20 MG: 20 INJECTION, SOLUTION INTRAVENOUS at 08:33

## 2019-02-20 RX ADMIN — OXYCODONE HYDROCHLORIDE 5 MG: 5 SOLUTION ORAL at 08:44

## 2019-02-20 RX ADMIN — ONDANSETRON 4 MG: 2 INJECTION INTRAMUSCULAR; INTRAVENOUS at 08:56

## 2019-02-20 RX ADMIN — ACETAMINOPHEN 975 MG: 325 SOLUTION ORAL at 12:50

## 2019-02-20 RX ADMIN — INSULIN ASPART 2 UNITS: 100 INJECTION, SOLUTION INTRAVENOUS; SUBCUTANEOUS at 08:37

## 2019-02-20 RX ADMIN — AMLODIPINE BESYLATE 2.5 MG: 2.5 TABLET ORAL at 08:40

## 2019-02-20 RX ADMIN — INSULIN ASPART 2 UNITS: 100 INJECTION, SOLUTION INTRAVENOUS; SUBCUTANEOUS at 12:50

## 2019-02-20 RX ADMIN — SUCRALFATE 1 G: 1 SUSPENSION ORAL at 08:44

## 2019-02-20 RX ADMIN — MULTIVITAMIN 15 ML: LIQUID ORAL at 08:44

## 2019-02-20 RX ADMIN — FAMOTIDINE 20 MG: 20 INJECTION, SOLUTION INTRAVENOUS at 20:28

## 2019-02-20 RX ADMIN — INSULIN ASPART 1 UNITS: 100 INJECTION, SOLUTION INTRAVENOUS; SUBCUTANEOUS at 03:57

## 2019-02-20 RX ADMIN — SUCRALFATE 1 G: 1 SUSPENSION ORAL at 14:43

## 2019-02-20 RX ADMIN — ACETAMINOPHEN 975 MG: 325 SOLUTION ORAL at 20:29

## 2019-02-20 RX ADMIN — SUCRALFATE 1 G: 1 SUSPENSION ORAL at 20:29

## 2019-02-20 RX ADMIN — ACETAMINOPHEN 975 MG: 325 SOLUTION ORAL at 03:57

## 2019-02-20 RX ADMIN — OXYCODONE HYDROCHLORIDE 5 MG: 5 SOLUTION ORAL at 20:29

## 2019-02-20 RX ADMIN — LISINOPRIL 20 MG: 20 TABLET ORAL at 08:40

## 2019-02-20 RX ADMIN — ESCITALOPRAM OXALATE 20 MG: 10 TABLET ORAL at 08:40

## 2019-02-20 ASSESSMENT — ACTIVITIES OF DAILY LIVING (ADL)
ADLS_ACUITY_SCORE: 16

## 2019-02-20 ASSESSMENT — PAIN DESCRIPTION - DESCRIPTORS: DESCRIPTORS: ACHING;SORE

## 2019-02-20 NOTE — PROGRESS NOTES
THORACIC & FOREGUT SURGERY    S:  No acute overnight events.  Pt seen at bedside resting comfortably. Tolerating tube feeds at goal. Had bm.       O:  Vitals:    02/19/19 2000 02/19/19 2204 02/20/19 0400 02/20/19 0409   BP: 165/65 165/72 160/74 157/73   BP Location: Left arm Left arm Left arm Right arm   Pulse:   78    Resp: 18 18 18    Temp: 97.8  F (36.6  C) 98.8  F (37.1  C) 96.6  F (35.9  C)    TempSrc: Oral Oral Oral    SpO2: 98% 100% 98%    Weight:         A&O, NAD  Breathing non-labored  Soft, NDNT, J tube site healthy appearing  Distal extremities warm    A/P: Truongmari Jones is a 84 year old male POD#2 s/p laparoscopic jejunostomy tube insertion.    -TF @ goal (50ml/hr)  -Plan to cycle TFs today - the patient is 100% tube feed dependent due to esophageal cancer. He will need to be on tube feeds long term, anticipate > 90 days  -Wean TPN  -Bowel regimen  -Anna dispo in 2 days  -PTA amlodipine, lisinopril  -Lovenox held due to thrombocytopenia    Patient seen with fellow    Sun Laird, PGY1  Thoracic Surgery

## 2019-02-20 NOTE — PLAN OF CARE
Discharge Planner OT   Patient plan for discharge: Open to TCU  Current status: Pt limited this session by N/V. Pt completed bed mobility min-mod A. Pt requiring cues for hand placement for safety with commode transfer. Completed pericares with SBA. Max Ax2 for boosting up in bed.   Barriers to return to prior living situation: general deconditioning, cognition, medical status  Recommendations for discharge: TCU  Rationale for recommendations: Pt below baseline at present for ADLs/IADLs and functional mobility. Pt will benefit from continued skilled therapies to increase I and safety with daily activities.       Entered by: Mag Benitez 02/20/2019 9:10 AM

## 2019-02-20 NOTE — PLAN OF CARE
"7B PT: CANCEL: Pt politely declines PT, reports feeling unwell and having been \"up and down\" all day. Appears very tired, returns to sleep quickly. Will reschedule for tomorrow.  "

## 2019-02-20 NOTE — PLAN OF CARE
Status:  Pt admitted for dehydration/malnutrition. Pt has h/o esophageal adenocarcinoma. POD #1 s/p J-tube placement.   VS: VSS, ex hypertensive within parameters. On room air.     Neuros: A&Ox4. Calls appropriately.   GI: Tolerating clear liquids. Pt able to swallow small pills. Larger need to be crushed and put down J-tube. Pt tolerating TF running at goal of 50 mL/hr. +BS, passing flatus. Lrg loose BM x1.  : Voiding frequent, small amounts.    IV: Port infusing TPN, PIV infusing LR at 40 mL/hr.    Activity: Up 1-2 assist w/GB and walker. Pt ambulated to bathroom overnight.   Pain: Pt c/o abdominal pain. Gave scheduled tylenol and PRN oxycodone.   Respiratory: LS dim. IS at bedside, encourage to use when awake.   Skin: J-tube dressing changed this AM.   Labs:  BG q4h- 185, 177. Sliding scale insulin given.   Plan of care:  Pt to see PLC for TF w/ family at 1600 today. Continue to monitor and follow POC.

## 2019-02-20 NOTE — PROVIDER NOTIFICATION
/65 (BP Location: Left arm)   Pulse 59   Temp 97.8  F (36.6  C) (Oral)   Resp 18   Wt 64 kg (141 lb)   SpO2 98%   BMI 25.14 kg/m    8:52 PM  Provider notified: Sisi  Reason for notification: Can we get total IV fluid order changed? TPN is running at 60mL/hr and I was told by previous RN that we are to keep LR at 40. Thanks.   Orders:

## 2019-02-20 NOTE — OP NOTE
Preoperative diagnosis:                          Esophageal cancer  Postoperative diagnosis:                        Esophageal cancer    Procedure:   1. Esophagoscopy  2. Laparoscopic jejunostomy    Anesthesia: General   Surgeon: Jaime Szymanski   Assistant:  Patrizia Pleitez MD  Resident surgeon: Cristela Rivera MD  EBL:  Minimal    Complications: none immediate  Findings:  Jejunostomy in distal small bowel; partially obstructing esophageal tumor  Description of procedure  The patient was brought to the room and placed supine upon the table.  After confirming the patient's identity and signed the consent, appropriate monitoring devices were placed as well as SCD boots.  General anesthesia was administered and the patient was intubated.  Intravenous Ancef was administered within one hour prior to the incision.  The patient was secured to the table while remaining supine and a foot board was placed.  The endoscope was passed into the esophagus and a partially obstructing mass was noted at 35 cm from the incisors.    The abdomen was prepped with chlorhexidine and draped in standard surgical fashion.  A 12 mm incision was made two fingerbreadths to the right of the umbilicus.  This was carried down through to the peritoneal cavity using the Maren technique.  Vicryl suture was used to anchor the fascia.  A 12 mm port was placed and pneumoperitoneum was established to 15 mmHg.  There were no significant adhesions.  A 10 mm 30-degree laparoscope was inserted into the peritoneal cavity.  A 5 mm port was placed in the right lower quadrant and another 5 mm port was placed in the right subcostal area.   The omentum was retracted cephalad.  The ligament of Treitz was located and the bowel was measured to a point 40 cm distal and which would reach the proposed site of the jejunostomy.  The jejunum was tacked to the anterior abdominal wall with 2-0 silk and secured to the anterior abdominal wall with the Jose Zafar device.   The introducer needle was placed into the jejunum, insufflating to confirm proper placement in the jejunum.  The guidewire was passed distally.  The needle was then removed.  A tract was created using serial dilation and the 20 Fr introducer dilator and sheath was then introduced into the jejunum.  A 16 Fr jejunostomy was placed into the distal jejunum now without difficulty.  Proper intraluminal placement was confirmed with air insufflation.  The jejunum was tacked to the anterior abdominal wall surrounding the insertion site with the help of the Ultimate Shopper device.  The balloon was then filled with 1.5 cc of water and properly positioned, taking care to no place undue pressure on the bowel or abdominal wall or to obstruct the lumen.  Approximately two centimeters distal to this, an anti-torsion stitch was placed, tacking up the jejunum against the anterior abdominal wall.  The bumper was placed against the skin, ensuring no undue pressure.  The tube was placed to drainage.    Insufflation was allowed to escape as the ports were removed under direct vision.  The initial port site was closed at the level of the fascia.  The incisions were irrigated and closed with 4-0 Vicryl.  The areas were cleaned and dried and Dermabond was applied.

## 2019-02-20 NOTE — PLAN OF CARE
Vitals:    02/19/19 0000 02/19/19 0444 02/19/19 0824 02/19/19 1551   BP:  156/71 142/73 152/76   BP Location:  Left arm Left arm Left arm   Pulse:       Resp: 14 26 19 19   Temp:  98.1  F (36.7  C) 96.1  F (35.6  C) 97.3  F (36.3  C)   TempSrc:  Oral Oral Oral   SpO2: 97% 97% 97% 97%   Weight:         Shift: 8066-1290    Afebrile, 97% RA, other VSS. Oxycodone for abdominal pain once.  Denies nausea and SOB. TF advanced to 30ml/hr at 1900.   MIVF 40ml/hr, TPN 60ml/hr. Resting between cares. Continue POC.

## 2019-02-20 NOTE — PLAN OF CARE
/72 (BP Location: Left arm)   Pulse 59   Temp 98.8  F (37.1  C) (Oral)   Resp 18   Wt 64 kg (141 lb)   SpO2 100%   BMI 25.14 kg/m    5660-2868: A/O, VSS on RA. Pain controlled with PRN oxy. Denies nausea. TF at 30mL/hr, to be advance by 10cc at 0000. Voiding in urinal. TPN infusing per orders. Up with Ax2. Will continue POC.

## 2019-02-20 NOTE — PROGRESS NOTES
STAFF ADDENDUM:  I saw and evaluated Mr. Jones and agree with the resident s findings and plan of care as documented in the resident s note and edited by me, as applicable.      In summary, Mr. Jones is tolerating tube feeds and we are working on disposition.   The patient had all questions answered and was in agreement with the plan.  Vj Paris MD

## 2019-02-20 NOTE — PROGRESS NOTES
"Social Work: Assessment with Discharge Plan    Patient Name:  Truong Jones  :  3/28/1934  Age:  84 year old  MRN:  [id number]  Risk/Complexity Score:     Completed assessment with:  Chart review, RNCC Ladonna pt, pt's daughter Miryam ([phone])    Presenting Information   Reason for Referral:  Discharge plan  Date of Intake:  2019  Referral Source:  Chart Review  Decision Maker:  Pt at baseline  Alternate Decision Maker:  Juni GAO.  Miryam reported she knows pt's medical wishes and has had conversations with pt around his medical condition.  Health Care Directive:  Miryam reported that pt has started a health care directive but it is not notarized. Miryam will try and locate this document and bring in a copy to be notarized.   Living Situation:  House with daughter Miryam and granddaughter Cinthia  Previous Functional Status:  Assistance with Other:  Per PT eval pt utilizes AE at baseline for ambulation and transferring. Miryam reported pt has asked her to manage the bills and they are working on getting financial POA in place.   Patient and family understanding of hospitalization:  Pt understands he had a feeding tube placed. Miryam has a very good understanding of pt's situation.   Cultural/Language/Spiritual Considerations:  Pt is an english speaking male.  Adjustment to Illness:  When SW asked how pt is feeling pt replied \"not good\" and discussed feeling really full from TPN and tube feeds. Pt was rather minimally engaged during visit and had his eyes closed some of the time.     Physical Health  Reason for Admission:  No diagnosis found.  Services Needed/Recommended:  TCU    Mental Health/Chemical Dependency  Diagnosis:  Depression per H&P. Not able to fully assess with pt. In talking with Miryam she discussed how pt has never recovered from the passing of his wife and sometimes \"moves right into that comfortable sad place\". Miryam discussed having had a goals of care conversation with pt prior to getting " "feeding tube and reported she doesn't want pt to make decisions because of his depression so asked pt to get feeding tube placed and see how he feels in a month.   Support/Services in Place:  Miryam reported that pt takes antidepressants. Miryam also discussed how she always like to have comedies on when she visits pt and noted that more activity would help pt and noted that pt needs help \"gettint out of those funks\".    Services Needed/Recommended:  Continue with current services and potentially re-evaluate medications    Support System  Significant relationship at present time: Pt's daughter Miryam (whom works half time) and granddaughter Cinthia (whom is in school full time and working full time) whom pt lives with. Miryam discussed how she and Cinthia work out their work schedules so that one of them can always be home with pt. Miryam noted she is the one whom is usually home with pt. Miryam also noted she is interested in  services for pt in case she or Cinthia can't be with pt, plus it would relieve some caregiver burden.  will provide Miryam with a list of agencies that offer this service.   Family of origin is available for support: Yes  Other support available:  Miryam reported that pt has good friends  Gaps in support system:  Pt currently needs TCU  Patient is caregiver to:  None     Provider Information   Primary Care Physician:  David Gold   [phone]   Clinic:  [address]  / [facility]*      :  Unknown    Financial   Income Source:  Did not discuss  Financial Concerns: Miryam noted some minor financial concerns  Insurance:    Payor/Plan Subscriber Name Rel Member # Group #   MEDICARE - MEDICARE F* KATYA DELVALLE*  [id number]       ATTN CLAIMS, [address]   [facility] MOOKKATYA*  [id number]      [address]       Discharge Plan   Patient and family discharge goal:  TCU then home  Provided education on discharge plan:  " YES  Patient agreeable to discharge plan:  YES, daughter Miryam is agreeable to TCU as well  A list of Medicare Certified Facilities was provided to the patient and/or family to encourage patient choice. Patient's choices for facility are:  Yes, left at pt's bedside. SW informed Miryam of this and she will review the list with pt when she arrives later today and indicate choices and rank preference. Miryam noted being interested in [facility] where her mom was in the past. No referral will be made until it can be confirmed if pt is ok with referral being made here.   Will NH provide Skilled rehabilitation or complex medical:  YES  General information regarding anticipated insurance coverage and possible out of pocket cost was discussed. Patient and patient's family are aware patient may incur the cost of transportation to the facility, pending insurance payment: YES, covered some details with pt and all details with Miryam. PITER explained to Miryam that tube feeds are not considered a skilled need for TCU coverage so pt would be expected to discharge from TCU to home with tube feeds and be able to independently manage them or have someone who can help manage them. Miryam reported feeling that pt's deconditioning is the biggest issue and is ok with pt going home with tube feeds. Miryam reported feeling that PT/OT are pt's biggest areas of need right now.    Barriers to discharge:  Medical stability, bed availability/acceptance at TCU    Discharge Recommendations   Anticipated Disposition:  Facility:  TCU, location to be determined  Transportation Needs:  Family:  Miryam will likely provide transportation if safe and appropriate  Name of Transportation Company and Phone:  [employer] if needed- [phone]    LIDIA Franklin, MSW  7B   [phone] (pager) [phone]  2/20/2019

## 2019-02-20 NOTE — PROGRESS NOTES
"SPIRITUAL HEALTH SERVICES  SPIRITUAL ASSESSMENT Progress Note  [facility] ([place] 7B     REFERRAL SOURCE: Epic ASAP Consult.    Visited pt, oriented him to [facility], offered emotional support and prayer. Pt expressed that he was diagnosed with cancer last December and he has a son who is autistic and lives in a group home, a daughter and a grand daughter. Pt said, \"my wife  ten years ago and I miss her.\" Pt said, \"I am an old man and I have cancer, so I am of no use.\" Pt expressed that he is having a hard time trying to decide whether to have surgery or not. He said, \"my daughter and my grand daughter wants me to live, but I don't.\" Pt also expressed concern about his son, and said, \"but my daughter and granddaughter will take good care of him.\" Pt requested me to come and see him again. At the end we prayed and he shed tears.    PLAN: I will follow up with pt on Friday is pt is still on unit.    Makenzie Johnson  Chaplain Resident  Pager  [phone]    "

## 2019-02-20 NOTE — PROGRESS NOTES
CLINICAL NUTRITION SERVICES      Nutrition Prescription     RECOMMENDATIONS FOR MDs/PROVIDERS TO ORDER:  Wean/discontinue parenteral nutrition.      Recommendations already ordered by Registered Dietitian (RD):  Change to cyclic regimen of Isosource 1.5 @ 65 ml/hr x 18 hrs (9pm-3pm or as pt prefers)     Future/Additional Recommendations:  For all recommendations, see prior nutrition notes.      Diet: Clear liquids  TFs: Isosource 1.5 is currently at goal rate of 50 mL/hr via J-tube  Central Parenteral Nutrition: Remains on PN, dextrose at goal since 2/19. Per team, will discontinue PN.      Follow up/Monitoring:  Will continue to follow pt.     Tara Lacey, MS, RD, LD, CNSC  Sabi Weeks, ROSY, LD      Nutrition will continue to follow. 5A/7B ROSY Pgr: [phone]

## 2019-02-20 NOTE — PLAN OF CARE
Vitals:    02/20/19 0700 02/20/19 0841 02/20/19 1244 02/20/19 1602   BP: (!) 168/95 167/73 157/77 154/80   BP Location: Right arm Left arm Left arm Left arm   Pulse:   85 60   Resp: 18 18 18   Temp: 98.8  F (37.1  C)  95.8  F (35.4  C) 96.6  F (35.9  C)   TempSrc: Oral  Oral Oral   SpO2: 99%  98% 98%   Weight:         Afebrile, 98% RA, other VSS. Oxycodone and schedule Tylenol for pain, effective. Zofran x1 for scant emesis this morning, effective. TF to be cycled starting tonight. TPN stopped. LR 40ml/hr via chest port. Declined CLD. Declined ambulating this shift. Pt feeling down, Spiritual health consult placed, pt willing to speak with someone. Continue POC.

## 2019-02-21 ENCOUNTER — APPOINTMENT (OUTPATIENT)
Dept: PHYSICAL THERAPY | Facility: CLINIC | Age: 84
DRG: 374 | End: 2019-02-21
Attending: THORACIC SURGERY (CARDIOTHORACIC VASCULAR SURGERY)
Payer: MEDICARE

## 2019-02-21 ENCOUNTER — APPOINTMENT (OUTPATIENT)
Dept: OCCUPATIONAL THERAPY | Facility: CLINIC | Age: 84
DRG: 374 | End: 2019-02-21
Attending: THORACIC SURGERY (CARDIOTHORACIC VASCULAR SURGERY)
Payer: MEDICARE

## 2019-02-21 LAB
GLUCOSE BLDC GLUCOMTR-MCNC: 115 MG/DL (ref 70–99)
GLUCOSE BLDC GLUCOMTR-MCNC: 141 MG/DL (ref 70–99)
GLUCOSE BLDC GLUCOMTR-MCNC: 166 MG/DL (ref 70–99)
GLUCOSE BLDC GLUCOMTR-MCNC: 207 MG/DL (ref 70–99)
GLUCOSE BLDC GLUCOMTR-MCNC: 215 MG/DL (ref 70–99)

## 2019-02-21 PROCEDURE — 97535 SELF CARE MNGMENT TRAINING: CPT | Mod: GO | Performed by: OCCUPATIONAL THERAPIST

## 2019-02-21 PROCEDURE — 25000132 ZZH RX MED GY IP 250 OP 250 PS 637: Mod: GY | Performed by: THORACIC SURGERY (CARDIOTHORACIC VASCULAR SURGERY)

## 2019-02-21 PROCEDURE — 25000128 H RX IP 250 OP 636: Performed by: STUDENT IN AN ORGANIZED HEALTH CARE EDUCATION/TRAINING PROGRAM

## 2019-02-21 PROCEDURE — 25000132 ZZH RX MED GY IP 250 OP 250 PS 637: Mod: GY | Performed by: STUDENT IN AN ORGANIZED HEALTH CARE EDUCATION/TRAINING PROGRAM

## 2019-02-21 PROCEDURE — A9270 NON-COVERED ITEM OR SERVICE: HCPCS | Mod: GY | Performed by: STUDENT IN AN ORGANIZED HEALTH CARE EDUCATION/TRAINING PROGRAM

## 2019-02-21 PROCEDURE — 00000146 ZZHCL STATISTIC GLUCOSE BY METER IP

## 2019-02-21 PROCEDURE — 12000001 ZZH R&B MED SURG/OB UMMC

## 2019-02-21 PROCEDURE — A9270 NON-COVERED ITEM OR SERVICE: HCPCS | Mod: GY | Performed by: THORACIC SURGERY (CARDIOTHORACIC VASCULAR SURGERY)

## 2019-02-21 PROCEDURE — 25000132 ZZH RX MED GY IP 250 OP 250 PS 637: Mod: GY | Performed by: SURGERY

## 2019-02-21 PROCEDURE — A9270 NON-COVERED ITEM OR SERVICE: HCPCS | Mod: GY | Performed by: SURGERY

## 2019-02-21 PROCEDURE — 97530 THERAPEUTIC ACTIVITIES: CPT | Mod: GP

## 2019-02-21 PROCEDURE — 25800030 ZZH RX IP 258 OP 636: Performed by: STUDENT IN AN ORGANIZED HEALTH CARE EDUCATION/TRAINING PROGRAM

## 2019-02-21 PROCEDURE — 27210429 ZZH NUTRITION PRODUCT INTERMEDIATE LITER

## 2019-02-21 PROCEDURE — 97116 GAIT TRAINING THERAPY: CPT | Mod: GP

## 2019-02-21 RX ORDER — FAMOTIDINE 20 MG/1
20 TABLET, FILM COATED ORAL EVERY 12 HOURS
Status: DISCONTINUED | OUTPATIENT
Start: 2019-02-21 | End: 2019-02-22 | Stop reason: HOSPADM

## 2019-02-21 RX ADMIN — OXYCODONE HYDROCHLORIDE 5 MG: 5 SOLUTION ORAL at 04:04

## 2019-02-21 RX ADMIN — SODIUM CHLORIDE, POTASSIUM CHLORIDE, SODIUM LACTATE AND CALCIUM CHLORIDE: 600; 310; 30; 20 INJECTION, SOLUTION INTRAVENOUS at 20:28

## 2019-02-21 RX ADMIN — INSULIN ASPART 1 UNITS: 100 INJECTION, SOLUTION INTRAVENOUS; SUBCUTANEOUS at 23:57

## 2019-02-21 RX ADMIN — ONDANSETRON 4 MG: 2 INJECTION INTRAMUSCULAR; INTRAVENOUS at 15:38

## 2019-02-21 RX ADMIN — INSULIN ASPART 1 UNITS: 100 INJECTION, SOLUTION INTRAVENOUS; SUBCUTANEOUS at 04:04

## 2019-02-21 RX ADMIN — FAMOTIDINE 20 MG: 20 INJECTION, SOLUTION INTRAVENOUS at 07:57

## 2019-02-21 RX ADMIN — MULTIVITAMIN 15 ML: LIQUID ORAL at 07:57

## 2019-02-21 RX ADMIN — LISINOPRIL 20 MG: 20 TABLET ORAL at 07:57

## 2019-02-21 RX ADMIN — SUCRALFATE 1 G: 1 SUSPENSION ORAL at 07:57

## 2019-02-21 RX ADMIN — OXYCODONE HYDROCHLORIDE 5 MG: 5 SOLUTION ORAL at 14:49

## 2019-02-21 RX ADMIN — AMLODIPINE BESYLATE 2.5 MG: 2.5 TABLET ORAL at 07:57

## 2019-02-21 RX ADMIN — SUCRALFATE 1 G: 1 SUSPENSION ORAL at 14:46

## 2019-02-21 RX ADMIN — ACETAMINOPHEN 975 MG: 325 SOLUTION ORAL at 11:58

## 2019-02-21 RX ADMIN — ACETAMINOPHEN 975 MG: 325 SOLUTION ORAL at 04:04

## 2019-02-21 RX ADMIN — INSULIN ASPART 2 UNITS: 100 INJECTION, SOLUTION INTRAVENOUS; SUBCUTANEOUS at 08:09

## 2019-02-21 RX ADMIN — ESCITALOPRAM OXALATE 20 MG: 10 TABLET ORAL at 07:57

## 2019-02-21 RX ADMIN — INSULIN ASPART 2 UNITS: 100 INJECTION, SOLUTION INTRAVENOUS; SUBCUTANEOUS at 11:56

## 2019-02-21 RX ADMIN — OXYCODONE HYDROCHLORIDE 5 MG: 5 SOLUTION ORAL at 07:56

## 2019-02-21 RX ADMIN — SUCRALFATE 1 G: 1 SUSPENSION ORAL at 20:58

## 2019-02-21 RX ADMIN — FAMOTIDINE 20 MG: 20 TABLET ORAL at 20:57

## 2019-02-21 RX ADMIN — SENNOSIDES AND DOCUSATE SODIUM 1 TABLET: 8.6; 5 TABLET ORAL at 20:57

## 2019-02-21 RX ADMIN — PROCHLORPERAZINE EDISYLATE 5 MG: 5 INJECTION INTRAMUSCULAR; INTRAVENOUS at 20:18

## 2019-02-21 RX ADMIN — ACETAMINOPHEN 975 MG: 325 SOLUTION ORAL at 20:57

## 2019-02-21 ASSESSMENT — ACTIVITIES OF DAILY LIVING (ADL)
ADLS_ACUITY_SCORE: 16

## 2019-02-21 NOTE — PROGRESS NOTES
CLINICAL NUTRITION SERVICES - REASSESSMENT NOTE     Nutrition Prescription    RECOMMENDATIONS FOR MDs/PROVIDERS TO ORDER:  1. Consider endocrinology consult if BG is difficult to manage  2. Fluids per MD    Malnutrition Status:    Severe malnutrition in the context of acute on chronic illness    Recommendations already ordered by Registered Dietitian (RD):  1. J-tube: Isosource 1.5 @ 65 mL/hr x 18 hours (9PM-3PM or as pt prefers) to provide 1755 kcal (27 kcal/kg/day), 80 g PRO (1.25 g/kg/day), 206 g CHO  -->60 mL FWF before and after cyclic feeds.    Future/Additional Recommendations:  To meet 100% of fluid needs:  --Needs additional 750 mL FWF for hydration. Consider 125 mL q4h FWF or schedule as needed to better fit pt schedule    -if tolerates over 18 hours further titrate to isosource 1.5 @ 100 mL/hr x 12 hours (9PM-9AM or as pt prefers)  -->60 mL FWF before and after cyclic feeds. Needs additional 750 mL FWF for hydration. Consider 125 mL q4h FWF or schedule as needed to better fit pt schedule     EVALUATION OF THE PROGRESS TOWARD GOALS   Diet: CLD (for comfort/pleasure)    Nutrition Support: TPN Stopped on 2/20. J-tube: Isosource 1.5 @ 65 mL/hr x 18 hours    Intake: 47% of goal, however feeds held in transition from continuous to cyclic feeds and pt still has feeds running for first cyclic day (which will show intakes lower than actual intake until transition complete)      NEW FINDINGS   General: Follow up with pt this morning who reports that he is tolerating cycled TF well so far without any issues.  PT recommending TCU at discharge.  Pt without any questions/concerns for RD regarding nutrition/eneral nutrition.  CLD is for comfort/pleasure. On Enteral nutrition support d/t esophageal cancer    Labs: Cl 100 H, remaining lytes WNL  Albumin 2.8 L (2/18) - likely with illness/inflammation  TG 92 WNL (2/18)    Blood Glucose: 166-215 mg/dL so far today    Medications: Novolog - medium insulin resistance scaling  q4h, certavite, miralax, doc-senna, carafate    GI: BM x 1-2 (2/20)    Weight: 64 kg (2/16) - no new wt since  *current assessed needs remain appropriate    MALNUTRITION  Pt falling asleep during visit, did not complete full exam due to this  % Intake: Decreased intake does not meet criteria -TPN/EN to meet needs  % Weight Loss: > 10% in 6 months (severe);   Subcutaneous Fat Loss: Facial region, Lower arm and Thoracic/intercostal: Mild - per previous RD  Muscle Loss: Temporal, Lower arm  (forearm) and Dorsal hand: Mild-Moderate (pt is unsure if he has noticed muscle loss) per previous rD visit  Fluid Accumulation/Edema: Trace per chart  Malnutrition Diagnosis: Severe malnutrition in the context of acute on chronic illness    Previous Goals   Total avg nutritional intake to meet a minimum of 20 kcal/kg with TPN or 25 kcal/kg with EN/PO and 1.2 g PRO/kg daily (per dosing wt 64 kg).  Evaluation: Met    Previous Nutrition Diagnosis  Inadequate protein-energy intake related to difficulty swallowing and nausea/vomiting over past couple weeks and inadequate PN provision as evidenced by pt report of minimal intake over past couple weeks d/t these symptoms, wt loss of 30 lbs over past 6 months, pt currently on PPN that only meets ~70% of minimum estimated kcal and protein needs and need for TPN to meet nutrition needs at this time.   Evaluation: No longer applicable, nutrition diagnosis changed below    CURRENT NUTRITION DIAGNOSIS  Inadequate oral intake related to esophageal  Cancer with difficulty swallowing prior to admission as evidenced by enteral nutrition to maintain nutritional status.      INTERVENTIONS  Implementation  Collaboration with other providers -7B rounds, discussion with thoracics PA  Enteral Nutrition - see above    Goals  Total avg nutritional intake to meet a minimum of 25 kcal/kg and 1.2 g PRO/kg daily (per dosing wt 66 kg).    Monitoring/Evaluation  Progress toward goals will be monitored and  evaluated per protocol.    Elizabeth Lopez RD, , LD.  7B and 5A (Beds 2968-1 to 7311-2) Pager: [phone]

## 2019-02-21 NOTE — PLAN OF CARE
Discharge Planner OT   Patient plan for discharge: TCU  Current status: Pt min A bed mobility, requiring hand of TH for stabilization to sit EOB. Pt required VC for safe STS with FWW, completed STS and in-room ambulation with FWW SBA. Pt tolerated 10 mins standing ADLs, no LOB. Following education, pt able to complete safe shower transfer using grab bars with CGA-SBA. Pt reporting fatigue following session, although motivated for therapies today. VSS.   Barriers to return to prior living situation: decreased activity tolerance and functional endurance, medical status  Recommendations for discharge: TCU  Rationale for recommendations: Pt below baseline at present for ADLs/IADLs and functional mobility. Pt will benefit from cont. skilled therapies to increase safety and I with daily activities.        Entered by: Mag Benitez 02/21/2019 10:08 AM        Follow-up results of blood culture x2, urine culture  Catheter associated UTI  Continue IV Rocephin for now, titrate antibiotics accordingly to results of cultures

## 2019-02-21 NOTE — PLAN OF CARE
"2057-3468    ..BP (!) 155/93 (BP Location: Left arm)   Pulse 89   Temp 96.6  F (35.9  C) (Oral)   Resp 16   Wt 64 kg (141 lb)   SpO2 99%   BMI 25.14 kg/m        ..Activity: up with assist of 1 and walker   Neuros: alert and orientated x 4, calm and cooperative   Cardiac: WNL   Respiratory: O2 sats 96-99% on RA  GI:  abdomen soft/tender.  Last BM 2/20   : voiding spont (adequate amounts)   Diet: clears, TF's at 65cc/hr (6165-4372) tolerating   Skin: intact except for surgical inc's/drains   Lines: PAC   Incisions:  abdominal lap sites CDI   Drains: J-tube with TF's   Labs: pending   Pain:  getting scheduled tylenol and prn oxycodone with \"adequate\" relief  nausea: denies   New changes this shift: none   Plan: continue POC.  Pt learning center Friday at 1200 for J-tube teaching       "

## 2019-02-21 NOTE — PROGRESS NOTES
THORACIC & FOREGUT SURGERY    S:  No acute overnight events.  Pt seen at bedside resting comfortably. Tolerating tube feeds.  +BM     O:  Vitals:    02/20/19 2001 02/20/19 2200 02/21/19 0406 02/21/19 0738   BP: 160/73 150/78 (!) 155/93 158/83   BP Location: Left arm Left arm Left arm Left arm   Pulse: 79 77 89 89   Resp: 18 16 16 18   Temp: 99.4  F (37.4  C) 97.9  F (36.6  C) 96.6  F (35.9  C) 97.3  F (36.3  C)   TempSrc: Oral Oral Oral Oral   SpO2: 98% 96% 99% 98%   Weight:         A&O, NAD  Breathing non-labored  Soft, NDNT, J tube site healthy appearing  Distal extremities warm    A/P: Truong S Karen is a 84 year old male POD#3 s/p laparoscopic jejunostomy tube insertion.    -TFs cycled, tolerated well  -Wean TPN  -Bowel regimen  -Anna zhong in 1-2 days  -PTA amlodipine, lisinopril  -Lovenox held due to thrombocytopenia    Jeremiah Grady PA-C  P: [phone]

## 2019-02-21 NOTE — PROGRESS NOTES
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    TCU is recommended for pt, pt and his daughter Miryam are agreeable to this and are reviewing a list of TCU options.     Per chart review pt met with  yesterday and commented that he is unsure if he wants to have further surgery and commented that his family wants him to live but he doesn't want to.     Per Thoracic team pt will be ready for discharge Friday with J tube feeds.     Intervention:  SW attempted to meet with pt to explore pt's comments with the  and discuss discharge planning. Pt was fast asleep and unable to be woken up with a few attempts of calling his name.     SW contacted pt's daughter Miryam (p. [phone]) and she reported she spoke with pt about TCU and [facility] is number one choice facility.   - [facility] (p. [phone], f. [phone])- spoke with Lurdes in admissions and bed availability Friday is unknown but there may be an opening Saturday and she is willing to review referral, referral sent via [employer], waiting to hear back.   Miryam reported she was unable to make it to the hospital yesterday so will be reviewing the Medicare list of TCUs for the first time today and asked SW for the names and locations of other TCUs near her/pt's home. PITER reviewed Medicare list with Miryam and she reported that the following TCUs would also work well:  - [facility] (p. [phone], f. [phone])- spoke with Madison with admissions and there is bed availability Friday, referral sent via Epic,  waiting to hear back.   - [facility] (p. [phone], f. [phone]) - Zachariah in admissions and there is no bed availability as of now.   - [facility] (via liaison Black [phone])- unlikely bed availability at this time.   - [facility] (p. [phone], f. [phone])- left vm for Eva in admissions inquiring about bed availability, waiting to hear back. Shannon called back reporting there is bed availability and the private room at facility is $40. PITER sent referral via Epic, waiting to hear back.   - [facility] (p. [phone], f. [phone])- spoke with Qian in admissions and there is bed availability, referral sent via [employer], waiting to hear back. Qian called back reporting pt is accepted.   - [facility] (p. [phone], f. [phone])- spoke with Darlyn with admissions and there is bed availability, referral sent via [employer], waiting to hear back.     SW met with pt in pt's room to check in and discuss discharge planning. Pt was sitting in his chair and able to be engaged in visit. Pt discussed not feeling very good today either compared to yesterday and reported having stomach pain. SW asked if getting up and moving would help and pt reported he did yesterday and that right now he wants to be left alone. SW referenced 's note indicating that pt is unsure about having surgery and that his daughter and granddaughter want him to live but he doesn't want to. Pt discussed feeling that TCU is the next step for him and wanted SW to speak with his daughter Miryam about this and said he doesn't know the right answers and she is smarter than he is. SW explained that there are no right or wrong answers, it is more so how pt feels about things. Pt commented that he feels miserable all the time. SW asked if there is anything that would help pt feel better and he reported that if there was something he would be doing it. SW noted pt's  diagnosis of depression and pt reported he doesn't know who diagnosed this and reported he doesn't feel depressed (although pt presents as depressed).   PITER explained conversation with Miryam yesterday and again today and that she has a list of TCUs that she thinks would be good for pt. Pt is agreeable to referrals being made to TCUs of Miryam's choosing. SW explained that referrals will be made. Pt denied having further SW needs or questions at this time.     SW paged Thoracic team indicating that a Palliative or Psych consult would be good for pt and indicated concern about pt's mood and overall demeanor.     SW met with pt and pt's daughter Miryam late per Miryam's request. SW provided update on status of referrals including acceptance at [facility] and [facility], which Miryam is ok with. Miryam noted that she is no longer interested in [facility] or [facility] after seeing the Medicare ratings. SW explained private room fee at [facility] and Miryam reported this is too expensive. Neither pt or Miryam had further questions or SW needs at this time.     Assessment:  See bedside RN, PT/OT, medical team notes    Plan:    Anticipated Disposition:  Facility:  TCU, location to be determined but pt is accepted at [facility] and [facility]    Barriers to d/c plan:  Medical stability, bed availability/acceptance    Follow Up:  PITER SCHRADER will continue to follow    LIDIA Franklin, MSW  7B   [phone] (pager) [phone]  2/21/2019

## 2019-02-21 NOTE — PLAN OF CARE
/83 (BP Location: Left arm)   Pulse 89   Temp 97.3  F (36.3  C) (Oral)   Resp 18   Wt 64 kg (141 lb)   SpO2 98%   BMI 25.14 kg/m     Patient reports pain is tolerable with scheduled Tylenol and Oxycodone x 2.  Abdomen soft with bowel sounds present, patient reports he is passing gas and had a BM today. Abdominal lap sites C/D/I. Up with assist of one and walker.  Voiding adequate amounts of urine. Tube feeding infusing through J- tube, stopped at 1430.  Continue with POC.

## 2019-02-21 NOTE — PLAN OF CARE
/83 (BP Location: Left arm)   Pulse 89   Temp 97.3  F (36.3  C) (Oral)   Resp 18   Wt 64 kg (141 lb)   SpO2 98%   BMI 25.14 kg/m

## 2019-02-21 NOTE — PLAN OF CARE
Discharge Planner PT   Patient plan for discharge: TCU  Current status: Ambulates ~150 ft with rolling walker and CGA, able to do stairs with rails and CGA, 30 sec sit-stand 5 reps, fatigues easily.  Barriers to return to prior living situation: Medical, impaired functional mobility, weakness, fall risk  Recommendations for discharge: TCU  Rationale for recommendations: Current status.       Entered by: Gee Fitzgerald 02/21/2019 12:34 PM     30-Second Sit to Stand Test:  The test is conducted with a straight back chair, without armrests, with a 17-inch seat height.    Patient Score (reps): 5     The 30 Second Sit to Stand Test is considered a test of fall risk.  Data from [employer], cosponsored by [place] Dept of Health:  8 or less times = High Fall Risk   9 to 12 times = Moderate Risk  13 or more times = Low Risk    The 30 Second Sit to Stand Test is also considered a test of leg strength and endurance.   Normative Data from Dontrell et al,. 2001  Age                 Reps: Men        Reps: Women  60-64                14-19                       12-17                               65-69                12-18                       11-16                    70-74                12-17                       10-15              75-79                11-17                       10-15                    80-84                10-15                         9-14  85-89                 8-14                          8-13  90-94                 7-12                          4-11    Assessment (rationale for performing, application to patient?s function & care plan): Assess LE strength and fall risk

## 2019-02-22 ENCOUNTER — APPOINTMENT (OUTPATIENT)
Dept: OCCUPATIONAL THERAPY | Facility: CLINIC | Age: 84
DRG: 374 | End: 2019-02-22
Attending: THORACIC SURGERY (CARDIOTHORACIC VASCULAR SURGERY)
Payer: MEDICARE

## 2019-02-22 VITALS
TEMPERATURE: 96.6 F | WEIGHT: 141 LBS | OXYGEN SATURATION: 98 % | SYSTOLIC BLOOD PRESSURE: 157 MMHG | DIASTOLIC BLOOD PRESSURE: 80 MMHG | BODY MASS INDEX: 25.14 KG/M2 | HEART RATE: 78 BPM | RESPIRATION RATE: 18 BRPM

## 2019-02-22 LAB
ANION GAP SERPL CALCULATED.3IONS-SCNC: 8 MMOL/L (ref 3–14)
BUN SERPL-MCNC: 24 MG/DL (ref 7–30)
CALCIUM SERPL-MCNC: 8 MG/DL (ref 8.5–10.1)
CHLORIDE SERPL-SCNC: 107 MMOL/L (ref 94–109)
CO2 SERPL-SCNC: 26 MMOL/L (ref 20–32)
CREAT SERPL-MCNC: 0.68 MG/DL (ref 0.66–1.25)
ERYTHROCYTE [DISTWIDTH] IN BLOOD BY AUTOMATED COUNT: 18.1 % (ref 10–15)
GFR SERPL CREATININE-BSD FRML MDRD: 87 ML/MIN/{1.73_M2}
GLUCOSE BLDC GLUCOMTR-MCNC: 170 MG/DL (ref 70–99)
GLUCOSE BLDC GLUCOMTR-MCNC: 175 MG/DL (ref 70–99)
GLUCOSE BLDC GLUCOMTR-MCNC: 193 MG/DL (ref 70–99)
GLUCOSE BLDC GLUCOMTR-MCNC: 195 MG/DL (ref 70–99)
GLUCOSE SERPL-MCNC: 214 MG/DL (ref 70–99)
HCT VFR BLD AUTO: 29.6 % (ref 40–53)
HGB BLD-MCNC: 9.3 G/DL (ref 13.3–17.7)
MAGNESIUM SERPL-MCNC: 2.1 MG/DL (ref 1.6–2.3)
MCH RBC QN AUTO: 30.5 PG (ref 26.5–33)
MCHC RBC AUTO-ENTMCNC: 31.4 G/DL (ref 31.5–36.5)
MCV RBC AUTO: 97 FL (ref 78–100)
PHOSPHATE SERPL-MCNC: 2.3 MG/DL (ref 2.5–4.5)
PLATELET # BLD AUTO: 72 10E9/L (ref 150–450)
POTASSIUM SERPL-SCNC: 4 MMOL/L (ref 3.4–5.3)
RBC # BLD AUTO: 3.05 10E12/L (ref 4.4–5.9)
SODIUM SERPL-SCNC: 141 MMOL/L (ref 133–144)
WBC # BLD AUTO: 3.1 10E9/L (ref 4–11)

## 2019-02-22 PROCEDURE — 36592 COLLECT BLOOD FROM PICC: CPT | Performed by: THORACIC SURGERY (CARDIOTHORACIC VASCULAR SURGERY)

## 2019-02-22 PROCEDURE — 00000146 ZZHCL STATISTIC GLUCOSE BY METER IP

## 2019-02-22 PROCEDURE — 84100 ASSAY OF PHOSPHORUS: CPT | Performed by: THORACIC SURGERY (CARDIOTHORACIC VASCULAR SURGERY)

## 2019-02-22 PROCEDURE — 80048 BASIC METABOLIC PNL TOTAL CA: CPT | Performed by: THORACIC SURGERY (CARDIOTHORACIC VASCULAR SURGERY)

## 2019-02-22 PROCEDURE — A9270 NON-COVERED ITEM OR SERVICE: HCPCS | Mod: GY | Performed by: SURGERY

## 2019-02-22 PROCEDURE — 25000132 ZZH RX MED GY IP 250 OP 250 PS 637: Mod: GY | Performed by: THORACIC SURGERY (CARDIOTHORACIC VASCULAR SURGERY)

## 2019-02-22 PROCEDURE — A9270 NON-COVERED ITEM OR SERVICE: HCPCS | Mod: GY | Performed by: STUDENT IN AN ORGANIZED HEALTH CARE EDUCATION/TRAINING PROGRAM

## 2019-02-22 PROCEDURE — A9270 NON-COVERED ITEM OR SERVICE: HCPCS | Mod: GY | Performed by: THORACIC SURGERY (CARDIOTHORACIC VASCULAR SURGERY)

## 2019-02-22 PROCEDURE — 99222 1ST HOSP IP/OBS MODERATE 55: CPT | Performed by: CLINICAL NURSE SPECIALIST

## 2019-02-22 PROCEDURE — 97530 THERAPEUTIC ACTIVITIES: CPT | Mod: GO

## 2019-02-22 PROCEDURE — 85027 COMPLETE CBC AUTOMATED: CPT | Performed by: STUDENT IN AN ORGANIZED HEALTH CARE EDUCATION/TRAINING PROGRAM

## 2019-02-22 PROCEDURE — 27210429 ZZH NUTRITION PRODUCT INTERMEDIATE LITER

## 2019-02-22 PROCEDURE — 25000132 ZZH RX MED GY IP 250 OP 250 PS 637: Mod: GY | Performed by: STUDENT IN AN ORGANIZED HEALTH CARE EDUCATION/TRAINING PROGRAM

## 2019-02-22 PROCEDURE — 83735 ASSAY OF MAGNESIUM: CPT | Performed by: THORACIC SURGERY (CARDIOTHORACIC VASCULAR SURGERY)

## 2019-02-22 PROCEDURE — 25000132 ZZH RX MED GY IP 250 OP 250 PS 637: Mod: GY | Performed by: SURGERY

## 2019-02-22 PROCEDURE — 25000128 H RX IP 250 OP 636: Performed by: THORACIC SURGERY (CARDIOTHORACIC VASCULAR SURGERY)

## 2019-02-22 PROCEDURE — 97535 SELF CARE MNGMENT TRAINING: CPT | Mod: GO

## 2019-02-22 RX ORDER — OXYCODONE HCL 5 MG/5 ML
5 SOLUTION, ORAL ORAL EVERY 4 HOURS PRN
Status: SHIPPED | DISCHARGE
Start: 2019-02-22 | End: 2019-02-22

## 2019-02-22 RX ORDER — HEPARIN SODIUM (PORCINE) LOCK FLUSH IV SOLN 100 UNIT/ML 100 UNIT/ML
500 SOLUTION INTRAVENOUS
Status: DISCONTINUED | OUTPATIENT
Start: 2019-02-22 | End: 2019-02-22 | Stop reason: HOSPADM

## 2019-02-22 RX ORDER — OXYCODONE HCL 5 MG/5 ML
5 SOLUTION, ORAL ORAL EVERY 4 HOURS PRN
Qty: 150 ML | Refills: 0 | Status: SHIPPED | DISCHARGE
Start: 2019-02-22 | End: 2019-03-24

## 2019-02-22 RX ADMIN — SENNOSIDES AND DOCUSATE SODIUM 1 TABLET: 8.6; 5 TABLET ORAL at 08:19

## 2019-02-22 RX ADMIN — LISINOPRIL 20 MG: 20 TABLET ORAL at 08:19

## 2019-02-22 RX ADMIN — ESCITALOPRAM OXALATE 20 MG: 10 TABLET ORAL at 08:18

## 2019-02-22 RX ADMIN — SUCRALFATE 1 G: 1 SUSPENSION ORAL at 15:13

## 2019-02-22 RX ADMIN — POLYETHYLENE GLYCOL 3350 17 G: 17 POWDER, FOR SOLUTION ORAL at 08:19

## 2019-02-22 RX ADMIN — INSULIN ASPART 1 UNITS: 100 INJECTION, SOLUTION INTRAVENOUS; SUBCUTANEOUS at 04:35

## 2019-02-22 RX ADMIN — Medication 500 UNITS: at 15:36

## 2019-02-22 RX ADMIN — AMLODIPINE BESYLATE 2.5 MG: 2.5 TABLET ORAL at 08:18

## 2019-02-22 RX ADMIN — INSULIN ASPART 2 UNITS: 100 INJECTION, SOLUTION INTRAVENOUS; SUBCUTANEOUS at 08:18

## 2019-02-22 RX ADMIN — MULTIVITAMIN 15 ML: LIQUID ORAL at 08:18

## 2019-02-22 RX ADMIN — ACETAMINOPHEN 975 MG: 325 SOLUTION ORAL at 04:35

## 2019-02-22 RX ADMIN — FAMOTIDINE 20 MG: 20 TABLET ORAL at 08:19

## 2019-02-22 RX ADMIN — INSULIN ASPART 2 UNITS: 100 INJECTION, SOLUTION INTRAVENOUS; SUBCUTANEOUS at 11:39

## 2019-02-22 RX ADMIN — SUCRALFATE 1 G: 1 SUSPENSION ORAL at 08:18

## 2019-02-22 RX ADMIN — ACETAMINOPHEN 975 MG: 325 SOLUTION ORAL at 11:29

## 2019-02-22 RX ADMIN — OXYCODONE HYDROCHLORIDE 5 MG: 5 SOLUTION ORAL at 11:27

## 2019-02-22 ASSESSMENT — ACTIVITIES OF DAILY LIVING (ADL)
ADLS_ACUITY_SCORE: 17

## 2019-02-22 NOTE — DISCHARGE SUMMARY
NAME: Truong Jones   MRN: [id number]   : 3/28/1934     DATE OF ADMISSION: 2019     PRE/POSTOPERATIVE DIAGNOSES: Esophageal cancer    CODING ADDENDUM:  Severe malnutrition in the context of acute on chronic illness      PROCEDURES PERFORMED:   1. Esophagoscopy  2. Laparoscopic jejunostomy    PATHOLOGY RESULTS:   ESOPHAGUS, LOW, BIOPSY: Invasive moderately differentiated adenocarcinoma     INTRAOPERATIVE COMPLICATIONS: None     POSTOPERATIVE COMPLICATIONS: None     DRAINS/TUBES PRESENT AT DISCHARGE: Feeding jejunostomy    DATE OF DISCHARGE:  2019     HOSPITAL COURSE: Truong Jones is a 84 year old male who on 2019 underwent the above-named procedures.  He tolerated the operation well and postoperatively was transferred to the general post-surgical unit.  The remainder of his course was essentially uncomplicated and consisted primarily of advancement and cycling of tube feeds.  Prior to discharge, his pain was controlled well, he was able to perform ADLs and ambulate independently without difficulty, and had full return of bowel and bladder function.  On 2019, he was discharged to [facility] in stable condition.    DISCHARGE EXAM:   A&O, NAD  Resp non-labored  Distal extremities warm    J tube site healthy appearing     DISCHARGE INSTRUCTIONS:  Discharge Procedure Orders   General info for SNF   Order Comments: Length of Stay Estimate: Short Term Care: Estimated # of Days <30  Condition at Discharge: Improving  Level of care:skilled   Rehabilitation Potential: Good  Admission H&P remains valid and up-to-date: Yes  Recent Chemotherapy: N/A  Use Nursing Home Standing Orders: Yes     Mantoux instructions   Order Comments: Give two-step Mantoux (PPD) Per Facility Policy Yes     Glucose monitor nursing POCT   Order Comments: Before meals and at bedtime     Intake and output   Order Comments: Every shift     Daily weights   Order Comments: Call Provider for  weight gain of more than 2 pounds per day or 5 pounds per week.     Activity - Up with nursing assistance     Order Specific Question Answer Comments   Is discharge order? Yes      Activity - Ambulate in hallway   Order Comments: Every shift     Order Specific Question Answer Comments   Is discharge order? Yes      Tubes   Order Comments: J TUBE INSTRUCTIONS:    Flush your feeding tube with 30cc of water;  1. After medication administration through the feeding tube  2. Before and after tube feeds  3. Every 8 hours while awake if you have not used the tube during that time      -If possible take medications by mouth or as solution via j tube (Do not put crushed pills through the tube if possible)     -Change the gauze around your tube daily or more often if soiled    -KEEP A FLEXI-TRACK (or other tube retention device) on your tube at all times to prevent incidental removal.     Follow Up and recommended labs and tests   Order Comments: 1.) Follow up with primary care physician, David Gold, in 1-2 weeks.  2.) Follow up with Dr. Jaime Szymanski in Thoracic Surgery clinic in 1 month.  This appointment can be moved to sooner if a PET scan for official staging has already been performed.     Physical Therapy Adult Consult   Order Comments: Evaluate and treat as clinically indicated.    Reason:  S/p thoracic surgery, deconditioning     Occupational Therapy Adult Consult   Order Comments: Evaluate and treat as clinically indicated.    Reason:  S/p thoracic surgery, deconditioning     Adult Formula Drip Feeding   Order Comments: Recommendations already ordered by Registered Dietitian (RD):  1. J-tube: Isosource 1.5 @ 65 mL/hr x 18 hours (9PM-3PM or as pt prefers)   -->60 mL FWF before and after cyclic feeds.     --Needs additional 750 mL FWF for hydration. Consider 60 mL q2h FWF     Fall precautions     Clear Liquid Diet     Order Specific Question Answer Comments   Is discharge order? Yes        DISCHARGE MEDICATIONS:    Current Discharge Medication List      START taking these medications    Details   multivitamins w/minerals (CERTAVITE) liquid 15 mLs by Per Feeding Tube route daily  Qty: 450 mL, Refills: 0    Associated Diagnoses: Failure to thrive (0-17)         CONTINUE these medications which have CHANGED    Details   oxyCODONE (ROXICODONE) 5 MG/5ML solution Take 5 mLs (5 mg) by mouth every 4 hours as needed for pain    Associated Diagnoses: Acute post-operative pain         CONTINUE these medications which have NOT CHANGED    Details   acetaminophen (PAIN & FEVER CHILDRENS) 160 MG/5ML solution Take 327-650 mg by mouth every 6 hours as needed for pain       amLODIPine (NORVASC) 2.5 MG tablet Take 2.5 mg by mouth daily       baclofen (LIORESAL) 10 MG tablet Take 10 mg by mouth 3 times daily as needed (hiccups)       escitalopram (LEXAPRO) 20 MG tablet Take 20 mg by mouth daily       lisinopril (PRINIVIL/ZESTRIL) 20 MG tablet Take 20 mg by mouth daily       magic mouthwash (FIRST-MOUTHWASH BLM) compounding kit Swish and swallow 5 mLs in mouth 3 times daily (before meals)      metFORMIN (GLUCOPHAGE) 1000 MG tablet Take 1,000 mg by mouth daily      omeprazole (PRILOSEC) 20 MG DR capsule Take 20 mg by mouth daily       polyethylene glycol (MIRALAX/GLYCOLAX) packet Take 17 g by mouth daily      prochlorperazine (COMPAZINE) 10 MG tablet Take 10 mg by mouth every 6 hours as needed for nausea or vomiting       Senna 176 MG/5ML SYRP Take 5 mLs by mouth daily      sucralfate (CARAFATE) 1 GM/10ML suspension Take 1 g by mouth 3 times daily      blood glucose monitoring (ACCU-CHEK RABIA PLUS) test strip USE AS DIRECTED TWICE DAILY

## 2019-02-22 NOTE — CONSULTS
2/22/19 I saw the patient's family in the room for PLC TEN education.Patient going to TCU today and likely may need tube feedings at home.Daughter and granddaughter correctly returned J-tube site cares,anchoring the tube,water flushes,medications, and feedings using an Infinity pump.Caregivers able to answer teach back,and verbalized understanding of content presented.All written material given and reviewed today.Also see education flow sheet.

## 2019-02-22 NOTE — PLAN OF CARE
Activity: Up in chair, back to bed with a1 and walker.  Neuro: Forgetful, otherwise A&Ox4. Calls appropriately.  GI/: No BM this shift, voiding small frequent amounts  Diet: Clear liquids, Tube feeding  Incisions/Drains: 4 lap sites PEDRO and approximated. J-tube running tube feeds at goal rate of 65mL/hr  IV Access: Right chest port accessed, infusing LR at 40mL/hr.  Labs: BG checks not requiring sliding scale insulin coverage.  Vitals: WNL  Pain: sufficiently managed with scheduled tylenol  New changes this shift: n/a  Plan: TCU placement maybe tomorrow. Continue POC.

## 2019-02-22 NOTE — CONSULTS
[facility], [place]    Palliative Care Consultation Note    Patient: Truong Jones  Date of Admission:  2019    Requesting provider/team: Jeremiah Grady PA-C  Reason for consult: Pain and Symptom management  Goals of care  Decisional support  Patient and family support    Recommendations:  -encouraged family to reach out to primary oncologist for resources for counseling around adjustment to illness  -also encouraged them to follow up with his OP psychiatrist after this hospitalization  -palliative care would be appropriate and family to reach out to their oncologist should they be interested in further support from palliative care providers    Thank you for the opportunity to participate in the care of this patient and family. Our team will follow. Please feel free to contact the on-call Palliative provider with any urgent needs.     BRANDAN Aceves CNS  Palliative Care Consult Team  Pager: [phone]    [facility] Inpatient Team Consult pager [phone] (M-F 8-4:30)  After-hours Answering Service [phone]   Palliative Clinic: [phone]       Assessment:  Truong Jones is a 84 year old male with a esophageal adenocarcinoma that is s/p radiation and chemotherapy which has been complicated by difficulty with oral intake, epigastric pain, dehydration, and FTT. He was admitted for placement of a PEG tube. Since being admitted he had the PEG tube placed and is working on discharge to TCU today.    Symptoms: pain in his mid chest from cancer treatments, pretty much gone today he says, using oxycodone.    Social:          Living situation: lives with daughter and granddaughter       Support system: daughter Miryam, granddaughter Cinthia. Son with autism who lives in group home. Wife  in .       Functional status: walks with a olmstead       Occupation: was a , musician       Hobbies: going to coffee with friends, he just got an instant pot and  was learning new recipes.     Coping: he feels pretty good today. Family feels that he is more depressed around being her at the hospital since he is not able to have his regular routine and has less stimulation.     Advance Care Planning:         Decision making capacity: patient mostly rested this visit       Disease understanding: family has good understanding, the say that their hope is that the nutrition will given him more strength and feel better, they plan to restage his cancer and made decisions further at that point.        Goals of Care: discussed with family that the plan is to restage his cancer after his treatments and discuss next steps. Family mentions having a major surgery. Discussed with them that connecting with palliative care would also be good if its available in their cancer clinic. Discussed that we would be happy to help if he does end up being admitted here again in the future. Discussed with daughter and granddaughter that patient's wife  of NSCLC and went from having a year to there was nothing else they can do, wife was in hospice at the end of her life. Granddaughter notes that it was hard for the wife and she saw her suffer.       Health care directive: none in chart    History of Present Illness   Sources of History:patient, electronic health record and patient's family    Truong Jones is a 84 year old male with a esophageal adenocarcinoma that is s/p radiation and chemotherapy which has been complicated by difficulty with oral intake, epigastric pain, dehydration, and FTT. He was admitted for placement of a PEG tube. Since being admitted he had the PEG tube placed and is working on discharge to TCU today.    Palliative care consulted  for esophageal cancer, symptom management and coping with illness.    ROS:  A comprehensive ROS has been negative other than stated in the HPI and below:   Palliative Symptom Review (0=no symptom/no concern, 1=mild, 2=moderate,  3=severe):  Pain: 1  Fatigue: 0  Nausea: 0  Constipation: 0  Diarrhea: 0  Depressive Symptoms: 0  Anxiety: 0  Drowsiness: 0  Poor Appetite: 0  Shortness of Breath: 0  Insomnia: 0  Delirium: 0  Other: 0  Overall (0 good/no concerns, 3 very poor): 1       Past Medical History:   Past Medical History:   Diagnosis Date     Personal history of prostate cancer 12/25/2018     Polymyalgia rheumatica (H) 12/25/2018          Past Surgical History:   Past Surgical History:   Procedure Laterality Date     LAPAROSCOPIC ASSISTED INSERTION TUBE JEJUNOSTOMY N/A 2/18/2019    Procedure: Laparoscopic Jejunostomy Tube Insertion and Esophagogastroduodenoscopy;  Surgeon: Jaime Szymanski MD;  Location: [facility] OR             Family History:   History reviewed. No pertinent family history.       Allergies:   Allergies   Allergen Reactions     Morphine      B/p dropped            Medications:   I have reviewed this patient's medication profile and medications given in the past 24 hours.    Acetaminophen 975 mg q8h  Lexapro 20 mg daily  Pepcid 20 mg q12h  Miralax daily  Senna 1-2 tablets BID  Sucralafate 1 g TID  Flexeril 5 mg TID PRN  Ondansetron PRN- x1  Oxycodone 5-7.5 mg q4h PRN- 10 mg over last 24 hours  Compazine PRN- x1         Physical Exam:   Vital Signs: Temp: 96.6  F (35.9  C) Temp src: Oral BP: 157/80 Pulse: 78 Heart Rate: 80 Resp: 18 SpO2: 98 % O2 Device: None (Room air)    Weight: 141 lbs 0 oz    Physical Exam:  Constitutional: Awake, intermittently resting comfortable in bed, cooperative, no apparent distress  Lungs: No increased work of breathing  Neurologic: Awake, alert, oriented to name, place and time.   Neuropsychiatric: Normal affect, mood, orientation, memory and insight.  Skin: No rashes, erythema, pallor, petechia or purpura.     Data reviewed:     ROUTINE ICU LABS (Last four results)  CMP  Recent Labs   Lab 02/22/19  0634 02/20/19  0704 02/19/19  0708 02/19/19  0122 02/18/19  0631 02/17/19  0734 02/16/19  1925     139 138  --  137 138 138   POTASSIUM 4.0 4.5 4.5 4.8 3.3* 3.5 3.3*   CHLORIDE 107 110* 108  --  103 107 106   CO2 26 23 23  --  26 27 24   ANIONGAP 8 6 7  --  8 4 7   * 262* 210*  --  164* 138* 121*   BUN 24 26 28  --  17 16 19   CR 0.68 0.68 0.66  --  0.67 0.61* 0.59*   GFRESTIMATED 87 87 88  --  88 >90 >90   GFRESTBLACK >90 >90 >90  --  >90 >90 >90   JAZMINE 8.0* 7.8* 7.8*  --  8.0* 7.9* 8.0*   MAG 2.1 2.1 2.3  --  2.2 2.1 2.2   PHOS 2.3* 3.3 3.1  --  3.4 3.3 3.0   PROTTOTAL  --   --   --   --  5.7* 5.4* 5.9*   ALBUMIN  --   --   --   --  2.8* 2.7* 2.9*   BILITOTAL  --   --   --   --  0.5 0.5 0.4   ALKPHOS  --   --   --   --  77 70 76   AST  --   --   --   --  20 20 22   ALT  --   --   --   --  15 15 16     CBC  Recent Labs   Lab 02/22/19  0634 02/20/19  0704 02/19/19  0708 02/18/19  0631   WBC 3.1* 3.5* 5.4 3.2*   RBC 3.05* 2.94* 2.76* 3.16*   HGB 9.3* 8.9* 8.5* 9.7*   HCT 29.6* 28.4* 26.2* 29.5*   MCV 97 97 95 93   MCH 30.5 30.3 30.8 30.7   MCHC 31.4* 31.3* 32.4 32.9   RDW 18.1* 18.2* 17.3* 17.0*   PLT 72* 63* 67* 57*     INR  Recent Labs   Lab 02/18/19  0631 02/17/19  0734 02/16/19  1925   INR 1.15* 1.19* 1.17*     Arterial Blood GasNo lab results found in last 7 days.    BRANDAN Aceves CNS  Palliative Care Consult Team  Pager: [phone]    [facility] Inpatient Team Consult pager [phone] (M-F 8-4:30)  After-hours Answering Service [phone]  Palliative Clinic: [phone]     Total time spent was 60 minutes,  >50% of time was spent counseling and/or coordination of care regarding goals of care and symptom management.

## 2019-02-22 NOTE — PROGRESS NOTES
"SPIRITUAL HEALTH SERVICES  SPIRITUAL ASSESSMENT Progress Note  [facility] 7B     REFERRAL SOURCE: Follow up from 2/20.    Visited pt and offered emotional support and prayer. Pt expressed that he is doing better today and that he will be leaving the hospital soon and go to a TCU. Pt said \"I am feeling tired and sleepy,\" so I asked him whether we can pray together before I leave to which he said yes and we prayed the Lord's prayer.      PLAN: I will follow up if pt is till on unit.    Makenzie Johnson  Chaplain Resident  Pager  [phone]    "

## 2019-02-22 NOTE — PLAN OF CARE
/80 (BP Location: Left arm)   Pulse 78   Temp 96.6  F (35.9  C) (Oral)   Resp 18   Wt 64 kg (141 lb)   SpO2 98%   BMI 25.14 kg/m        VSS. Afebrile. Pain controlled. A&O x 4 Up with Assist x 1 walker. Lungs clear. Active bowel sounds. Passing gas. Adequate urine output. Using urinal. Tube feed infusing at goal. Appetite poor. Incisions intact. PEDRO. Adequate for discharge to TCU. All belongings left with patient. Family took patient in wheelchair to front door. Transporting patient by personal car.

## 2019-02-22 NOTE — PROGRESS NOTES
Social Work Services Discharge Note      Patient Name:  Truong Jones     Anticipated Discharge Date:  2/22/19    Discharge Disposition:   TCU:  [facility]   [address] [place] [address]  P. [phone], F. [phone]    Following MD:  To be determined at facility     Pre-Admission Screening (PAS) online form has been completed.  The Level of Care (LOC) is:  Determined  Confirmation Code is:  [id number].  Patient/caregiver informed of referral to [facility] for Pre-Admission Screening for skilled nursing facility (SNF) placement and to expect a phone call post discharge from SNF.     Additional Services/Equipment Arranged: Pt's daughter Miryam will provide transportation- time TBD but approximately 3-3:30pm. Pt can be off of tube feeds for transport and Qian in admissions at receiving facility said that no formula needs to be sent with pt as facility has pt's formula in stock.      Patient / Family response to discharge plan:  Pt and daughter Miryam are agreeable     Persons notified of above discharge plan:  Pt, pt's daughter Miryam, bedside nurse, charge nurse, NST, receiving facility, Thoracic team    Staff Discharge Instructions:  Please fax discharge orders and signed hard scripts for any controlled substances.  Please print a packet and send with patient.     CTS Handoff completed:  YES    Medicare Notice of Rights provided to the patient/family:  YES      LIDIA Franklin, MSW  7B   [phone] (pager) [phone]  2/22/2019

## 2019-02-22 NOTE — PROGRESS NOTES
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    TCU is recommended, referrals have been made and pt is has been accepted at [facility].     Per Thoracic team pt is ready for discharge today and there are no plans for any cancer treatment while pt is at TCU and any further treatment will be dependant on staging.     Intervention:  SW spoke with Dietician Sandra and was informed that pt's tube feed formula at discharge will be Isosource 1.5 or equivalent, 65 over 18 hours and pt's cycle has been from 9am to 3pm here but this can be changed at TCU if needed.     PITER contacted the following TCUs to follow up on referrals:  - [facility] (p. [phone], f. [phone], have access to Epic)- spoke with Lurdes in admissions and informed her of no cancer treatment plans as she was asking about this. Lurdes reported she has to check on bed availability for today and call back. PITER later received vm from [facility] reporting that due to pt's tube feeds facility is unable to accept pt today or over the weekend due to limited staffing on weekends.   - [facility] (p. [phone], f. [phone])- left vm for admissions inquiring about status of referral, waiting to hear back. PITER later spoke with [facility] and due to pt's tube feeds and current acuity on TCU pt is declined as facility cannot accommodate pt's needs.  - [facility] ([phone])- left vm for Zandra in admissions inquiring about bed availability, waiting to hear  "back.     PITER met with pt in pt's room along with pt's daughter Miryam and granddaughter Cinthia. Miryam reported they are waiting to go to [facility] for tube feed teaching. Pt was lying in bed and appeared rather tired and acknowledged he is tired. SW explained medical readiness for TCU and pt replied \"good\". SW provided update on status of referrals and pt and family are agreeable to discharge to [facility]. Miryam plans to transport pt and pt reported feeling he can get in and out of a vehicle.     PITER provided Miryam with a list of various agencies that provide companionship services. Miryam was receptive of this information.     PITER spoke with Qian in admissions at [facility] and provided update and tube feed information. Qian reported that facility has Isosource 1.5. Pt can admit anytime today as long as facility has orders at a reasonable time.     PAS completed in anticipation of discharge- confirmation code: [id number].      Assessment:  See bedside RN, PT/OT, medical team notes    Plan:    Anticipated Disposition:  Facility:  TCU, location to be determined    Barriers to d/c plan:  NA    Follow Up:  PITER SCHRADER will continue to follow    LIDIA Franklin, MSW  7B   [phone] (pager) [phone]  2/22/2019          "

## 2019-02-22 NOTE — PLAN OF CARE
Discharge Planner OT   Patient plan for discharge: TCU  Current status: CGA and FWW needed for sit<>stand transfers x4 from various surfaces with verbal cues for hand placement. CGA needed for standing gagandeep-cares. Mod A needed for LB dressing. Pt ambulated ~160' with FWW and CGA. Pt tolerated ambulation and standing ADLs for ~12 minutes with VSS. CGA and FWW needed for standing g/h tasks.   Barriers to return to prior living situation: Strength, endurance  Recommendations for discharge: TCU  Rationale for recommendations: To increase strength and activity tolerance to return to baseline independence with ADLs.        Entered by: Mary Lomeli 02/22/2019 2:12 PM        Occupational Therapy Discharge Summary    Reason for therapy discharge:    Discharged to transitional care facility.    Progress towards therapy goal(s). See goals on Care Plan in [facility] electronic health record for goal details.  Goals partially met.  Barriers to achieving goals:   discharge from facility.    Therapy recommendation(s):    Continued therapy is recommended.  Rationale/Recommendations:  Rec TCU to progress independence with ADLs/IADLs. .

## 2019-02-27 LAB — INTERPRETATION ECG - MUSE: NORMAL

## 2019-03-18 DIAGNOSIS — D37.8 NEOPLASM OF UNCERTAIN BEHAVIOR OF OTHER SPECIFIED DIGESTIVE ORGANS: ICD-10-CM

## 2019-03-18 DIAGNOSIS — C15.9: Primary | ICD-10-CM

## 2019-03-23 ENCOUNTER — HOSPITAL ENCOUNTER (OUTPATIENT)
Dept: PET IMAGING | Facility: CLINIC | Age: 84
Discharge: HOME | End: 2019-03-23
Attending: CLINICAL NURSE SPECIALIST | Admitting: CLINICAL NURSE SPECIALIST
Payer: MEDICARE

## 2019-03-23 ENCOUNTER — TELEPHONE (OUTPATIENT)
Dept: SURGERY | Facility: CLINIC | Age: 84
End: 2019-03-23

## 2019-03-23 DIAGNOSIS — D37.8 NEOPLASM OF UNCERTAIN BEHAVIOR OF OTHER SPECIFIED DIGESTIVE ORGANS: ICD-10-CM

## 2019-03-23 DIAGNOSIS — C15.9: ICD-10-CM

## 2019-03-23 LAB
CREAT BLD-MCNC: 0.8 MG/DL (ref 0.66–1.25)
GFR SERPL CREATININE-BSD FRML MDRD: >90 ML/MIN/{1.73_M2}
GLUCOSE BLDC GLUCOMTR-MCNC: 164 MG/DL (ref 70–99)

## 2019-03-23 PROCEDURE — 82565 ASSAY OF CREATININE: CPT

## 2019-03-23 PROCEDURE — 34300033 ZZH RX 343

## 2019-03-23 PROCEDURE — 74177 CT ABD & PELVIS W/CONTRAST: CPT

## 2019-03-23 PROCEDURE — 25000128 H RX IP 250 OP 636

## 2019-03-23 PROCEDURE — 82962 GLUCOSE BLOOD TEST: CPT

## 2019-03-23 PROCEDURE — A9552 F18 FDG: HCPCS

## 2019-03-23 PROCEDURE — 71260 CT THORAX DX C+: CPT

## 2019-03-23 RX ORDER — IOPAMIDOL 755 MG/ML
10-135 INJECTION, SOLUTION INTRAVASCULAR ONCE
Status: COMPLETED | OUTPATIENT
Start: 2019-03-23 | End: 2019-03-23

## 2019-03-23 RX ADMIN — FLUDEOXYGLUCOSE F-18 10.17 MCI.: 500 INJECTION, SOLUTION INTRAVENOUS at 08:53

## 2019-03-23 RX ADMIN — IOPAMIDOL 86 ML: 755 INJECTION, SOLUTION INTRAVENOUS at 10:04

## 2019-03-24 NOTE — TELEPHONE ENCOUNTER
Brief Surgery Note:    Paged by radiology regarding PE in the posterior basal segment of the right lowe lobe from PET scan earlier today. Attempted to reach patient at home with no answer after 3 attempts. Confirmed with TCU that patient was discharged home about 2 weeks ago. Reached out to emergency contact, Miryam, who states he has been getting stronger since discharge from the TCY although he has a cough, and that the home care nurses have noticed some crackles. He does not have shortness of breat at rest but does have some with exertion. Informed the daughter of the findings and to contact his PCP for anticoagulation and further managment. Relayed to daughter to seek emergency care if there are any acute changes.        Robinson Tavares, PGY1  General Surgery

## 2019-03-26 LAB — RADIOLOGIST FLAGS: ABNORMAL

## 2019-04-01 NOTE — PROGRESS NOTES
"THORACIC SURGERY FOLLOW UP VISIT     Dear Dr. Gold,    I saw Mr. Jones in follow-up today. The clinical summary follows:      PREOP DIAGNOSIS   Esophageal cancer, Malnutrition in the context of acute on chronic illness     PROCEDURE   Esophagoscopy  Laparoscopic jejunostomy     DATE OF PROCEDURE  02/18/19     HISTOPATHOLOGY   ESOPHAGUS, LOW, BIOPSY: Invasive moderately differentiated adenocarcinoma      COMPLICATIONS  None     INTERVAL STUDIES    PET 03/23/19  1a. Index distal esophageal tumor with only mild metabolic activity.  1b. No evidence of local regional lymph node metastasis. There is only  a 4 mm regional lymph node in the right paraesophageal region adjacent  to caudate lobe, too small to characterize on FDG PET.  1c. No evidence of systemic metastatic disease.  2a. New pulmonary emboli in the right lower lobe posterior basal  segmental branch and following subsegmental branches.     ETOH - none  TOB - 30 year smoking history, quit 40 years ago   /74 (BP Location: Right arm, Patient Position: Sitting, Cuff Size: Adult Regular)   Pulse 115   Temp 97.3  F (36.3  C) (Oral)   Resp 16   Ht 1.595 m (5' 2.8\")   Wt 62.8 kg (138 lb 8 oz)   SpO2 96%   BMI 24.69 kg/m     J tube site intact       PMH  Type 2 diabetes mellitus - metformin   HTN - amlodipine, lisinopril, metoprolol   Polymyalgia rheumatica  Hx of prostate cancer  Depression  Thoracic aortic aneurysm     Past surgeries  Radical prostatectomy 6/5/2007       SUBJECTIVE   Mr. Jones is doing well, presents today for further discussion and possible surgery following his PET scan.  Of note there was a pulmonary embolus that was identified on his PET scan.  Patient did seek care from his PCP and has been started on Lovenox. He is tolerating soft foods mostly related to not been able to use the dentures his lower teeth secondary to pain.  He is tolerating his tube feeds at goal.  He does not have any pain and says that his obstructive " symptoms from swallowing have significantly gotten better.  Patient's daughter did mention that there was some drainage around the J-tube site and that was loose likely due to a loose suture.  She has been placing tape over the dressing to secure it in place. he denies any nausea, vomiting, chest pain, shortness of breath, or abdominal pain.  Patient had no other concerns for this visit     From a personal perspective, he is here with his daughter and granddaughter.  They both think that he is doing better and getting stronger.      IMPRESSION (C15.9) Esophageal cancer, stage IIB (H)  (primary encounter diagnosis)    This a 63 year-old male who remains debilitated after completing chemoradiation for stage IIB esophageal adenocarcinoma.  I discussed with patient associated risk factors with surgery given recent pulmonary embolus and his functional status.  He is not a good surgical candidate.  At this time, I recommend continued  observation.  Patient is agreeable to this plan and is in favor of observing and not pursuing surgery.      PLAN  I reviewed the plan as follows:  1) Follow-up with oncology.  2) Reduce tube feeds to half  3) Resecured J-tube in place  4) Encourage oral intake of more regular food  5) Follow-up in clinic when it is time to remove the J-tube     They had all their questions answered and were in agreement with the plan.  I appreciate the opportunity to participate in the care of your patient and will keep you updated.  Sincerely,

## 2019-04-02 ENCOUNTER — HOSPITAL ENCOUNTER (EMERGENCY)
Facility: CLINIC | Age: 84
Discharge: HOME | End: 2019-04-03
Attending: EMERGENCY MEDICINE | Admitting: EMERGENCY MEDICINE
Payer: MEDICARE

## 2019-04-02 ENCOUNTER — NURSE TRIAGE (OUTPATIENT)
Dept: NURSING | Facility: CLINIC | Age: 84
End: 2019-04-02

## 2019-04-02 ENCOUNTER — OFFICE VISIT (OUTPATIENT)
Dept: SURGERY | Facility: CLINIC | Age: 84
End: 2019-04-02
Attending: THORACIC SURGERY (CARDIOTHORACIC VASCULAR SURGERY)
Payer: MEDICARE

## 2019-04-02 VITALS
DIASTOLIC BLOOD PRESSURE: 74 MMHG | HEIGHT: 63 IN | WEIGHT: 138.5 LBS | SYSTOLIC BLOOD PRESSURE: 109 MMHG | RESPIRATION RATE: 16 BRPM | HEART RATE: 115 BPM | OXYGEN SATURATION: 96 % | TEMPERATURE: 97.3 F | BODY MASS INDEX: 24.54 KG/M2

## 2019-04-02 VITALS
TEMPERATURE: 98.1 F | HEIGHT: 63 IN | SYSTOLIC BLOOD PRESSURE: 109 MMHG | RESPIRATION RATE: 18 BRPM | DIASTOLIC BLOOD PRESSURE: 69 MMHG | OXYGEN SATURATION: 97 % | BODY MASS INDEX: 24.53 KG/M2

## 2019-04-02 DIAGNOSIS — T85.528A DISLODGED JEJUNOSTOMY TUBE: ICD-10-CM

## 2019-04-02 DIAGNOSIS — C15.9: Primary | ICD-10-CM

## 2019-04-02 PROCEDURE — 99284 EMERGENCY DEPT VISIT MOD MDM: CPT | Mod: Z6 | Performed by: EMERGENCY MEDICINE

## 2019-04-02 PROCEDURE — 44799 UNLISTED PX SMALL INTESTINE: CPT | Performed by: EMERGENCY MEDICINE

## 2019-04-02 PROCEDURE — 99213 OFFICE O/P EST LOW 20 MIN: CPT | Mod: 24 | Performed by: THORACIC SURGERY (CARDIOTHORACIC VASCULAR SURGERY)

## 2019-04-02 PROCEDURE — G0463 HOSPITAL OUTPT CLINIC VISIT: HCPCS | Mod: ZF

## 2019-04-02 PROCEDURE — 99284 EMERGENCY DEPT VISIT MOD MDM: CPT | Mod: 25,27 | Performed by: EMERGENCY MEDICINE

## 2019-04-02 RX ORDER — MIRTAZAPINE 15 MG/1
15 TABLET, ORALLY DISINTEGRATING ORAL
COMMUNITY
Start: 2019-02-26 | End: 2020-02-26

## 2019-04-02 RX ORDER — ONDANSETRON 4 MG/1
4 TABLET, ORALLY DISINTEGRATING ORAL
COMMUNITY
Start: 2019-02-26

## 2019-04-02 RX ORDER — DIPHENOXYLATE HCL/ATROPINE 2.5-.025MG
TABLET ORAL
COMMUNITY
Start: 2019-03-12

## 2019-04-02 ASSESSMENT — ENCOUNTER SYMPTOMS
ADENOPATHY: 0
COLOR CHANGE: 0
EYE REDNESS: 0
ABDOMINAL PAIN: 0
CHILLS: 0
HEADACHES: 0
NECK STIFFNESS: 0
DIFFICULTY URINATING: 0
ARTHRALGIAS: 0
FEVER: 0
BRUISES/BLEEDS EASILY: 0
CONFUSION: 0
SHORTNESS OF BREATH: 0

## 2019-04-02 ASSESSMENT — PAIN SCALES - GENERAL: PAINLEVEL: NO PAIN (0)

## 2019-04-02 ASSESSMENT — MIFFLIN-ST. JEOR: SCORE: 1205.1

## 2019-04-02 NOTE — LETTER
"4/2/2019       RE: Truong Jones  [address]  [facility] [address]     Dear Colleague,    Thank you for referring your patient, Truong Jones, to the [facility]. Please see a copy of my visit note below.    THORACIC SURGERY FOLLOW UP VISIT     Dear Dr. Gold,    I saw Mr. Jones in follow-up today. The clinical summary follows:      PREOP DIAGNOSIS   Esophageal cancer, Malnutrition in the context of acute on chronic illness     PROCEDURE   Esophagoscopy  Laparoscopic jejunostomy     DATE OF PROCEDURE  02/18/19     HISTOPATHOLOGY   ESOPHAGUS, LOW, BIOPSY: Invasive moderately differentiated adenocarcinoma      COMPLICATIONS  None     INTERVAL STUDIES    PET 03/23/19  1a. Index distal esophageal tumor with only mild metabolic activity.  1b. No evidence of local regional lymph node metastasis. There is only  a 4 mm regional lymph node in the right paraesophageal region adjacent  to caudate lobe, too small to characterize on FDG PET.  1c. No evidence of systemic metastatic disease.  2a. New pulmonary emboli in the right lower lobe posterior basal  segmental branch and following subsegmental branches.     ETOH - none  TOB - 30 year smoking history, quit 40 years ago   /74 (BP Location: Right arm, Patient Position: Sitting, Cuff Size: Adult Regular)   Pulse 115   Temp 97.3  F (36.3  C) (Oral)   Resp 16   Ht 1.595 m (5' 2.8\")   Wt 62.8 kg (138 lb 8 oz)   SpO2 96%   BMI 24.69 kg/m      J tube site intact       PMH  Type 2 diabetes mellitus - metformin   HTN - amlodipine, lisinopril, metoprolol   Polymyalgia rheumatica  Hx of prostate cancer  Depression  Thoracic aortic aneurysm     Past surgeries  Radical prostatectomy 6/5/2007       SUBJECTIVE   Mr. Jones is doing well, presents today for further discussion and possible surgery following his PET scan.  Of note there was a pulmonary embolus that was identified on his PET scan.  Patient did seek care from his PCP and " has been started on Lovenox. He is tolerating soft foods mostly related to not been able to use the dentures his lower teeth secondary to pain.  He is tolerating his tube feeds at goal.  He does not have any pain and says that his obstructive symptoms from swallowing have significantly gotten better.  Patient's daughter did mention that there was some drainage around the J-tube site and that was loose likely due to a loose suture.  She has been placing tape over the dressing to secure it in place. he denies any nausea, vomiting, chest pain, shortness of breath, or abdominal pain.  Patient had no other concerns for this visit     From a personal perspective, he is here with his daughter and granddaughter.  They both think that he is doing better and getting stronger.      IMPRESSION (C15.9) Esophageal cancer, stage IIB (H)  (primary encounter diagnosis)    This a 63 year-old male who remains debilitated after completing chemoradiation for stage IIB esophageal adenocarcinoma.  I discussed with patient associated risk factors with surgery given recent pulmonary embolus and his functional status.  He is not a good surgical candidate.  At this time, I recommend continued  observation.  Patient is agreeable to this plan and is in favor of observing and not pursuing surgery.      PLAN  I reviewed the plan as follows:  1) Follow-up with oncology.  2) Reduce tube feeds to half  3) Resecured J-tube in place  4) Encourage oral intake of more regular food  5) Follow-up in clinic when it is time to remove the J-tube     They had all their questions answered and were in agreement with the plan.  I appreciate the opportunity to participate in the care of your patient and will keep you updated.  Sincerely,          Jaime Szymanski MD

## 2019-04-02 NOTE — ED AVS SNAPSHOT
[facility], [place], Emergency Department  [address] [place] [address]  Phone:  [phone]                                    Truong Jones   MRN: [id number]    Department:  [facility], Emergency Department   Date of Visit:  4/2/2019           After Visit Summary Signature Page    I have received my discharge instructions, and my questions have been answered. I have discussed any challenges I see with this plan with the nurse or doctor.    ..........................................................................................................................................  Patient/Patient Representative Signature      ..........................................................................................................................................  Patient Representative Print Name and Relationship to Patient    ..................................................               ................................................  Date                                   Time    ..........................................................................................................................................  Reviewed by Signature/Title    ...................................................              ..............................................  Date                                               Time          22EPIC Rev 08/18

## 2019-04-02 NOTE — NURSING NOTE
"Oncology Rooming Note    April 2, 2019 3:26 PM   Truong Jones is a 85 year old male who presents for:    Chief Complaint   Patient presents with     RECHECK     [facility] f/up     Initial Vitals: /74 (BP Location: Right arm, Patient Position: Sitting, Cuff Size: Adult Regular)   Pulse 115   Temp 97.3  F (36.3  C) (Oral)   Resp 16   Ht 1.595 m (5' 2.8\")   Wt 62.8 kg (138 lb 8 oz)   SpO2 96%   BMI 24.69 kg/m   Estimated body mass index is 24.69 kg/m  as calculated from the following:    Height as of this encounter: 1.595 m (5' 2.8\").    Weight as of this encounter: 62.8 kg (138 lb 8 oz). Body surface area is 1.67 meters squared.  No Pain (0) Comment: Data Unavailable   No LMP for male patient.  Allergies reviewed: Yes  Medications reviewed: Yes    Medications: Medication refills not needed today.  Pharmacy name entered into EPIC: Data Unavailable    Clinical concerns: none        Lisa GIBRAN Reyes              "

## 2019-04-03 ENCOUNTER — APPOINTMENT (OUTPATIENT)
Dept: GENERAL RADIOLOGY | Facility: CLINIC | Age: 84
End: 2019-04-03
Attending: EMERGENCY MEDICINE
Payer: MEDICARE

## 2019-04-03 ENCOUNTER — TELEPHONE (OUTPATIENT)
Dept: ONCOLOGY | Facility: CLINIC | Age: 84
End: 2019-04-03

## 2019-04-03 VITALS
HEART RATE: 82 BPM | BODY MASS INDEX: 23.74 KG/M2 | TEMPERATURE: 98.4 F | OXYGEN SATURATION: 98 % | RESPIRATION RATE: 18 BRPM | SYSTOLIC BLOOD PRESSURE: 118 MMHG | HEIGHT: 63 IN | DIASTOLIC BLOOD PRESSURE: 74 MMHG | WEIGHT: 134 LBS

## 2019-04-03 DIAGNOSIS — Z43.1 ATTENTION TO G-TUBE (H): ICD-10-CM

## 2019-04-03 LAB — RADIOLOGIST FLAGS: ABNORMAL

## 2019-04-03 PROCEDURE — 99282 EMERGENCY DEPT VISIT SF MDM: CPT | Mod: Z6 | Performed by: EMERGENCY MEDICINE

## 2019-04-03 PROCEDURE — 40000986 XR ABDOMEN 1 VW

## 2019-04-03 PROCEDURE — 99281 EMR DPT VST MAYX REQ PHY/QHP: CPT | Performed by: EMERGENCY MEDICINE

## 2019-04-03 RX ADMIN — DIATRIZOATE MEGLUMINE AND DIATRIZOATE SODIUM 30 ML: 660; 100 SOLUTION ORAL; RECTAL at 00:45

## 2019-04-03 ASSESSMENT — MIFFLIN-ST. JEOR: SCORE: 1187.95

## 2019-04-03 NOTE — CONSULTS
Thoracic Surgery Consult Note     Patient name: Truong Jones  Date of Service: 4/2/2019  Reason for Consult: J tube fell out   Requesting Physician: Dr. Mckeon      Assessment/Plan:   84 yo M with esophageal cancer s/p chemo/XRT and lap J tube 6 weeks ago presents after his J tube fell out earlier today.    J tube replaced in ED with new 16F jejunostomy tube, follow up fluoro XR demonstrates contrast in distal small bowel and tube in correct position.  Balloon inflated to 3cc.    - OK to discharge from ED  - Follow up in thoracic surgery clinic as needed.    Discussed with fellow, Dr. Iraheta.    Linda Jacob  General Surgery PGY2    ------------------------------------------------------------------------  HPI:   84 yo M with PMH esophageal cancer s/p chemo and radiation, recent PE on lovenox, had a laparoscopic J tube placed 6 weeks and 1 day ago, presents to ED after J tube fell out today.  He was seen in thoracic clinic today, J tube was manipulated and a suture was removed, then he went home and bent over and reports that the tube fell out onto the floor.  A nurse at his assisted living facility placed a jane catheter into the hole and sent him to ED.  He currently denies any abdominal pain, nausea, vomiting, fevers, or chills.  He has been taking more PO lately and he is slowly weaning off the tube feeds, they were cut in half when he was seen in clinic today.  Recent PET scan 3/23 showed only mild metabolic activity at site of index GEJ tumor, and no evidence of systemic metastatic disease.    PMH:   Past Medical History:   Diagnosis Date     Cancer (H)      Personal history of prostate cancer 12/25/2018     Polymyalgia rheumatica (H) 12/25/2018     PSH:   Past Surgical History:   Procedure Laterality Date     LAPAROSCOPIC ASSISTED INSERTION TUBE JEJUNOSTOMY N/A 2/18/2019    Procedure: Laparoscopic Jejunostomy Tube Insertion and Esophagogastroduodenoscopy;  Surgeon: Jaime Szymanski MD;  Location:  OR  "    Meds:   (Not in a hospital admission)  Allergies:   Allergies   Allergen Reactions     Morphine      B/p dropped     FamHx: History reviewed. No pertinent family history.  SocHx:   Social History     Socioeconomic History     Marital status:      Spouse name: Not on file     Number of children: Not on file     Years of education: Not on file     Highest education level: Not on file   Occupational History     Not on file   Social Needs     Financial resource strain: Not on file     Food insecurity:     Worry: Not on file     Inability: Not on file     Transportation needs:     Medical: Not on file     Non-medical: Not on file   Tobacco Use     Smoking status: Former Smoker     Types: Cigarettes, Cigars     Start date:      Last attempt to quit:      Years since quittin.2     Smokeless tobacco: Never Used   Substance and Sexual Activity     Alcohol use: No     Frequency: Never     Drug use: No     Sexual activity: Not on file   Lifestyle     Physical activity:     Days per week: Not on file     Minutes per session: Not on file     Stress: Not on file   Relationships     Social connections:     Talks on phone: Not on file     Gets together: Not on file     Attends Je[employer]'s witness service: Not on file     Active member of club or organization: Not on file     Attends meetings of clubs or organizations: Not on file     Relationship status: Not on file     Intimate partner violence:     Fear of current or ex partner: Not on file     Emotionally abused: Not on file     Physically abused: Not on file     Forced sexual activity: Not on file   Other Topics Concern     Not on file   Social History Narrative     Not on file        ROS: 10-pt ROS negative except as noted above.     Objective:   /69   Temp 98.1  F (36.7  C) (Oral)   Resp 18   Ht 1.6 m (5' 3\")   SpO2 97%   BMI 24.53 kg/m    General - no acute distress, comfortable  HEENT - normocephalic, atraumatic, EOMI  Cardio - regular rate, regular " rhythm  Pulm - non labored respirations on room air  Abdomen - soft, nondistended, nontender.  J tube site clean, jane catheter in opening, taped to skin.  Neuro - moves all extremities without apparent deficit, non-focal  Extremities - no lower extremity edema, warm, well-perfused  Skin - no rash or bruising  Psych - age appropriate mental status / engagement     Labs:  None    Imaging:  None

## 2019-04-03 NOTE — TELEPHONE ENCOUNTER
"J tube \"fell out\".   Was in clinic today, dtr states resident was trying to \"fix the balloon\".  Very small amount of blood to J tube site, dtr plans to apply bandage, will bring to ED.     Reason for Disposition    Gastrostomy feeding tube (e.g., PEG tube) came out    Protocols used: FEEDING TUBE SYMPTOMS AND QUESTIONS-ADULT-      "

## 2019-04-03 NOTE — ED NOTES
Pt leaned over to go to the bathroom and PEG tube fell out. RN placed jane into hole to hold it open.

## 2019-04-03 NOTE — ED AVS SNAPSHOT
[facility], [place], Emergency Department  [address] [place] [address]  Phone:  [phone]                                    Truong Jones   MRN: [id number]    Department:  [facility], Emergency Department   Date of Visit:  4/3/2019           After Visit Summary Signature Page    I have received my discharge instructions, and my questions have been answered. I have discussed any challenges I see with this plan with the nurse or doctor.    ..........................................................................................................................................  Patient/Patient Representative Signature      ..........................................................................................................................................  Patient Representative Print Name and Relationship to Patient    ..................................................               ................................................  Date                                   Time    ..........................................................................................................................................  Reviewed by Signature/Title    ...................................................              ..............................................  Date                                               Time          22EPIC Rev 08/18

## 2019-04-03 NOTE — DISCHARGE INSTRUCTIONS
Please make an appointment to follow up with Surgery (General) (phone: [phone]) in 7 days if you have any concerns.  Your tube is in the correct position based on the imaging done today and it is ready for use.

## 2019-04-03 NOTE — TELEPHONE ENCOUNTER
Miryam, daughter, called into triage to state that Truong was seen in the ED last night after his g-tube fell out, and while it was replaced they received a call today that there are kinks in it. Per Ladonna Francois, they should report back to the ED as this is not something they can fix in clinic. Daughter verbalized understanding.    Elza Santiago RN

## 2019-04-03 NOTE — ED PROVIDER NOTES
[place] EMERGENCY DEPARTMENT ([facility])  4/03/19 [facility] 4:13 PM   History     Chief Complaint   Patient presents with     J Tube Problem     The history is provided by the patient, medical records and a relative (Daughter).     Truong Jones is a 85 year old male who presents for further evaluation of J tube kinking.  He has a history of esophageal cancer status post esophagoscopy and laparoscopic jejunostomy.  He has a feeding tube in place.  Patient was seen in clinic yesterday, saw Dr. Szymanski and daughter noted that the tube seemed loose. Resident re-inflated bulb to try to help with the tube looseness. When they got home patient went to the bathroom, had bent forward when suddenly the tube shot out of his abdominal wall.  He was seen in the ER by Dr. Mckeon who consulted thoracic surgery and ultimately surgery resident was able to replace patient's tube.  Abdominal x-ray was obtained for confirmation of placement.  With Dr. Mckeon reviewed the imaging and discussed with radiology resident and surgery resident.  It was felt that the tube was in the correct position and he was discharged home.  Today a staff radiologist read the imaging that was done overnight and was concerned that the tube was kinked in 2 places.  His radiologist contacted patient's care team and he was instructed to come in to the ER for further evaluation and management of this.  Daughter notes that there was some pain with the tube insertion yesterday but otherwise they have not had any further problems with this current tube.  Patient last had infusion at 1:45 AM last night. Feeling fine at this time.     I have reviewed the Medications, Allergies, Past Medical and Surgical History, and Social History in the [employer] system.  PAST MEDICAL HISTORY:   Past Medical History:   Diagnosis Date     Cancer (H)      Personal history of prostate cancer 12/25/2018     Polymyalgia rheumatica (H) 12/25/2018       PAST SURGICAL  HISTORY:   Past Surgical History:   Procedure Laterality Date     LAPAROSCOPIC ASSISTED INSERTION TUBE JEJUNOSTOMY N/A 2019    Procedure: Laparoscopic Jejunostomy Tube Insertion and Esophagogastroduodenoscopy;  Surgeon: Jaime Szymanski MD;  Location:  OR       FAMILY HISTORY: No family history on file.    SOCIAL HISTORY:   Social History     Tobacco Use     Smoking status: Former Smoker     Types: Cigarettes, Cigars     Start date:      Last attempt to quit:      Years since quittin.2     Smokeless tobacco: Never Used   Substance Use Topics     Alcohol use: No     Frequency: Never       Patient's Medications   New Prescriptions    No medications on file   Previous Medications    ACETAMINOPHEN (PAIN & FEVER CHILDRENS) 160 MG/5ML SOLUTION    Take 327-650 mg by mouth every 6 hours as needed for pain     AMLODIPINE (NORVASC) 2.5 MG TABLET    Take 2.5 mg by mouth daily     BACLOFEN (LIORESAL) 10 MG TABLET    Take 10 mg by mouth 3 times daily as needed (hiccups)     BLOOD GLUCOSE MONITORING (ACCU-CHEK RABIA PLUS) TEST STRIP    USE AS DIRECTED TWICE DAILY    DIPHENOXYLATE-ATROPINE (LOMOTIL) 2.5-0.025 MG TABLET        ENOXAPARIN (LOVENOX) 60 MG/0.6ML SYRINGE    Inject 60 mg Subcutaneous every 12 hours    ESCITALOPRAM (LEXAPRO) 20 MG TABLET    Take 10 mg by mouth daily     LISINOPRIL (PRINIVIL/ZESTRIL) 20 MG TABLET    Take 20 mg by mouth daily     MAGIC MOUTHWASH (FIRST-MOUTHWASH BLM) COMPOUNDING KIT    Swish and swallow 5 mLs in mouth 3 times daily (before meals)    METFORMIN (GLUCOPHAGE) 1000 MG TABLET    Take 1,000 mg by mouth daily    MIRTAZAPINE (REMERON SOLTAB) 15 MG ODT    Take 15 mg by mouth    MULTIPLE VITAMIN-FOLIC ACID PO    Take 5 mLs by mouth as needed    OMEPRAZOLE (PRILOSEC) 20 MG DR CAPSULE    Take 20 mg by mouth daily     ONDANSETRON (ZOFRAN-ODT) 4 MG ODT TAB    Take 4 mg by mouth    POLYETHYLENE GLYCOL (MIRALAX/GLYCOLAX) PACKET    Take 17 g by mouth daily    PROCHLORPERAZINE (COMPAZINE)  "10 MG TABLET    Take 10 mg by mouth every 6 hours as needed for nausea or vomiting     SENNA 176 MG/5ML SYRP    Take 5 mLs by mouth daily    SUCRALFATE (CARAFATE) 1 GM/10ML SUSPENSION    Take 1 g by mouth 3 times daily   Modified Medications    No medications on file   Discontinued Medications    No medications on file          Allergies   Allergen Reactions     Morphine      B/p dropped      Review of Systems   Gastrointestinal:        Kinking in tube       Physical Exam   BP: 126/73  Pulse: 93  Temp: 98.4  F (36.9  C)  Resp: 18  Height: 160 cm (5' 3\")  Weight: 60.8 kg (134 lb)  SpO2: 98 %      Physical Exam   Constitutional: No distress.   HENT:   Head: Atraumatic.   Mouth/Throat: Oropharynx is clear and moist. No oropharyngeal exudate.   Eyes: Pupils are equal, round, and reactive to light. No scleral icterus.   Cardiovascular: Normal heart sounds.   Pulmonary/Chest: Breath sounds normal. No respiratory distress.   Abdominal: Soft. He exhibits no distension. There is no tenderness.   G-tube in place   Musculoskeletal: He exhibits no edema or tenderness.   Neurological: He is alert.   Skin: Skin is warm. He is not diaphoretic.   Psychiatric: He has a normal mood and affect. His behavior is normal.       ED Course        Procedures        Results for orders placed or performed during the hospital encounter of 04/02/19 (from the past 24 hour(s))   XR Abdomen 1 View   Result Value Ref Range    Radiologist flags Catheter is kinked (Urgent)     Narrative    Exam: XR ABDOMEN 1 VW, 4/3/2019 12:55 AM    Indication: s/p J-tube replacement; please evaluate for placement    Comparison: PET/CT 3/23/2019    Findings: Supine abdominal radiograph. Jejunostomy catheter projecting  over the left mid pelvis. Catheter is kinked in 2 locations. Oral  contrast visualized within the small bowel. Nonobstructive bowel gas  pattern. No pneumatosis within the field-of-view. Vascular clips  projecting over the pelvis. There are 2 round " densities projecting  over the right hemipelvis, presumably external to the patient.  Bibasilar scarring.      Impression    Impression:   1. Oral contrast visualized within the small intestine, immediately  distal to the jejunostomy catheter. There are 2 separate kinks in the  catheter.  2. Nonobstructive bowel gas pattern.    [Access Center: Catheter is kinked]    This report will be copied to the [facility] to ensure a  provider acknowledges the finding. Access Center is available Monday  through Friday 8am-3:30 pm.     I have personally reviewed the examination and initial interpretation  and I agree with the findings.    KRYSTINA BRODY MD     Medications - No data to display         Assessments & Plan (with Medical Decision Making)   85-year-old male presents with a chief complaint of kinked J-tube.  This was replaced last night and has since been used without issue and had appropriate contrast appear distally after it was placed.  Tube was placed by thoracic surgery.  They evaluated at the bedside and we flush the tube without issue.  They are comfortable with the tube where it is at this stage.  Should they develop issues with flushing or using the tube the patient should come back for reevaluation.  Until then we will leave the tube where it is.  Patient otherwise has no complaints and he and his wife are comfortable with discharge home and the plan.    I have reviewed the nursing notes.    I have reviewed the findings, diagnosis, plan and need for follow up with the patient.       Medication List      ASK your doctor about these medications    multivitamins w/minerals liquid  15 mLs, Per Feeding Tube, DAILY  Ask about: Should I take this medication?     oxyCODONE 5 MG/5ML solution  Commonly known as:  ROXICODONE  5 mg, Oral, EVERY 4 HOURS PRN  Ask about: Should I take this medication?            Final diagnoses:   Attention to G-tube (H)   Verónica TALBERT, am serving as a trained medical  scribe to document services personally performed by Dylan Valle DO based on the provider's statements to me on April 3, 2019.  This document has been checked and approved by the attending provider.    I, Dylan Valle DO, was physically present and have reviewed and verified the accuracy of this note documented by Verónica Hagen, medical scribe.        4/3/2019   [facility], [place], EMERGENCY DEPARTMENT     Godwin Valle DO  04/03/19 1736

## 2019-04-03 NOTE — ED PROVIDER NOTES
[place] EMERGENCY DEPARTMENT ([facility])  4/02/19   History     Chief Complaint   Patient presents with     Gtube Problem     PEG tube fell out.      The history is provided by the patient and medical records.     Truong Jones is a 85 year old male who has a PMHx of s/p esophagoscopy and laparoscopic jejunostomy for esophogeal cancer, who presents to the Emergency Department for evaluation of feeding tube displacement. Patient is here with his daughter. He states he had his feeding tube placed in February. He states that today he had a follow up visit with thoracic surgery. After the visit this evening he went to the bathroom, he unzipped his pants and his J-tube fell out. His daughter states that it has not been working well and it has been dislodged. She states there was stitch holding the J-tube in place which is no longer in place. She also states that his balloon from the J-tube is no longer inflated properly.  He denies any pain, nausea, vomiting, or fevers.    Recent Imaging:    PET 03/23/19  1a. Index distal esophageal tumor with only mild metabolic activity.  1b. No evidence of local regional lymph node metastasis. There is only  a 4 mm regional lymph node in the right paraesophageal region adjacent  to caudate lobe, too small to characterize on FDG PET.  1c. No evidence of systemic metastatic disease.  2a. New pulmonary emboli in the right lower lobe posterior basal  segmental branch and following subsegmental branches.    I have reviewed the Medications, Allergies, Past Medical and Surgical History, and Social History in the [employer] system.    Past Medical History:   Diagnosis Date     Cancer (H)      Personal history of prostate cancer 12/25/2018     Polymyalgia rheumatica (H) 12/25/2018       Past Surgical History:   Procedure Laterality Date     LAPAROSCOPIC ASSISTED INSERTION TUBE JEJUNOSTOMY N/A 2/18/2019    Procedure: Laparoscopic Jejunostomy Tube Insertion and  Esophagogastroduodenoscopy;  Surgeon: Jaime Szymanski MD;  Location: [facility] OR       History reviewed. No pertinent family history.    Social History     Tobacco Use     Smoking status: Former Smoker     Types: Cigarettes, Cigars     Start date:      Last attempt to quit:      Years since quittin.2     Smokeless tobacco: Never Used   Substance Use Topics     Alcohol use: No     Frequency: Never       No current facility-administered medications for this encounter.      Current Outpatient Medications   Medication     amLODIPine (NORVASC) 2.5 MG tablet     baclofen (LIORESAL) 10 MG tablet     diphenoxylate-atropine (LOMOTIL) 2.5-0.025 MG tablet     enoxaparin (LOVENOX) 60 MG/0.6ML syringe     escitalopram (LEXAPRO) 20 MG tablet     lisinopril (PRINIVIL/ZESTRIL) 20 MG tablet     metFORMIN (GLUCOPHAGE) 1000 MG tablet     mirtazapine (REMERON SOLTAB) 15 MG ODT     MULTIPLE VITAMIN-FOLIC ACID PO     omeprazole (PRILOSEC) 20 MG DR capsule     polyethylene glycol (MIRALAX/GLYCOLAX) packet     Senna 176 MG/5ML SYRP     sucralfate (CARAFATE) 1 GM/10ML suspension     acetaminophen (PAIN & FEVER CHILDRENS) 160 MG/5ML solution     blood glucose monitoring (ACCU-CHEK RABIA PLUS) test strip     magic mouthwash (FIRST-MOUTHWASH BLM) compounding kit     ondansetron (ZOFRAN-ODT) 4 MG ODT tab     prochlorperazine (COMPAZINE) 10 MG tablet        Allergies   Allergen Reactions     Morphine      B/p dropped        Review of Systems   Constitutional: Negative for chills and fever.   HENT: Negative for congestion.    Eyes: Negative for redness.   Respiratory: Negative for shortness of breath.    Cardiovascular: Negative for chest pain.   Gastrointestinal: Negative for abdominal pain.        Feeding tube dislodged.   Genitourinary: Negative for difficulty urinating.   Musculoskeletal: Negative for arthralgias and neck stiffness.   Skin: Negative for color change.   Neurological: Negative for headaches.   Hematological: Negative for  "adenopathy. Does not bruise/bleed easily.   Psychiatric/Behavioral: Negative for confusion.   All other systems reviewed and are negative.      Physical Exam   BP: 109/69  Heart Rate: 113  Temp: 98.1  F (36.7  C)  Resp: 18  Height: 160 cm (5' 3\")  SpO2: 97 %      Physical Exam   Constitutional: He appears well-developed and well-nourished. No distress.   HENT:   Head: Normocephalic and atraumatic.   Eyes: EOM are normal.   Neck: Normal range of motion.   Cardiovascular: Normal rate.   Pulmonary/Chest: Effort normal. No respiratory distress.   Abdominal: Soft. He exhibits no distension and no mass. There is no tenderness. There is no rebound and no guarding.   De La Fuente catheter in the stoma in left upper quadrant.   Musculoskeletal: Normal range of motion.   Neurological: He is alert. No cranial nerve deficit. He exhibits normal muscle tone. Coordination normal.   Skin: Skin is warm. He is not diaphoretic. No erythema.   Psychiatric: He has a normal mood and affect. His behavior is normal. Judgment and thought content normal.   Nursing note and vitals reviewed.      ED Course   9:18 PM  The patient was seen and examined by Anne Mckeon MD in [address].       Procedures             Critical Care time:  none             Labs Ordered and Resulted from Time of ED Arrival Up to the Time of Departure from the ED - No data to display         Assessments & Plan (with Medical Decision Making)   85-year-old man presenting with dislodged feeding tube.  Patient did bring the tube with him and it appears that he had jejunostomy in place.  I will consult thoracic surgery who placed this tube for further recommendations.  Patient has no signs of surgical abdomen on the examination whatsoever.    Surgery resident was able to replace patient's G-tube.  Abdominal x-ray was obtained for confirmation of placement.  I reviewed the study, discussed it with the radiology resident and surgery resident and it appears that the tube is in the " correct position.  At this time patient stable for discharge.  He and his family member agreed to follow-up with surgery clinic if they have any further concerns or return to the emergency department.    This part of the medical record was transcribed by Hiro Patrick, Medical Scribe, from a dictation done by Tristan Mckeon MD.       I have reviewed the nursing notes.    I have reviewed the findings, diagnosis, plan and need for follow up with the patient.       Medication List      ASK your doctor about these medications    multivitamins w/minerals liquid  15 mLs, Per Feeding Tube, DAILY  Ask about: Should I take this medication?     oxyCODONE 5 MG/5ML solution  Commonly known as:  ROXICODONE  5 mg, Oral, EVERY 4 HOURS PRN  Ask about: Should I take this medication?            Final diagnoses:   Dislodged jejunostomy tube     IIsai, am serving as a trained medical scribe to document services personally performed by Anne Mckeon MD, based on the provider's statements to me.   Anne TALBERT MD, was physically present and have reviewed and verified the accuracy of this note documented by Isai Reyes.     4/2/2019   [facility], [place], EMERGENCY DEPARTMENT     Anne Mckeon MD  04/03/19 0104

## 2019-04-03 NOTE — ED TRIAGE NOTES
"Pt arrived in triage with concerns of his J tube coming out last night. Pt had a new one put in last night. Pt had radiologist called this morning stating that pt had some \"kinks\" in his new J tube and needs IR to see patient .  "

## 2019-04-10 ENCOUNTER — TELEPHONE (OUTPATIENT)
Dept: SURGERY | Facility: CLINIC | Age: 84
End: 2019-04-10

## 2019-04-10 NOTE — TELEPHONE ENCOUNTER
I spoke to Lexie (daughter) about the weaning process off tube feedings for Antonio.   She said he was down to 2, sometimes 2.5 cans/day.   He is eating pretty well but tends to eat a large meal and gets full fast.   I encouraged her to drop him down to 1 can/day for a few weeks and encourage him to eat several small but more frequent meals.   After a few weeks of taking 1 can/day, I encouraged her to then have him stop altogether.   He should weigh himself weekly, focus on eating good proteins and calories.  I told her that after a month off TF, if all is going well and his weight is stable, we could then likely remove the feeding tube.  She will call us when ready.

## 2023-04-13 NOTE — PLAN OF CARE
/72 (BP Location: Left arm)   Pulse (P) 78   Temp 98.4  F (36.9  C) (Oral)   Resp 16   Wt 64 kg (141 lb)   SpO2 98%   BMI 25.14 kg/m      Activity: Asist of 1 and a walker.  In bed all night  Neuro: Forgetful, otherwise A&Ox4. Calls appropriately.  GI/: No BM this shift, voiding adequately  Diet: Clear liquids, Tube feeding  Incisions/Drains: 4 lap sites PEDRO and approximated. J-tube running tube feeds at goal rate of 65mL/hr  IV Access: Right chest port accessed, infusing LR at 40mL/hr.  Labs: BG checks requiring 1 unit of insulin at 0000 and 0400   Vitals: WNL  Pain: sufficiently managed with scheduled tylenol  New changes this shift: n/a  Plan: TCU placement possibly today. Continue POC.       DISCHARGE

## (undated) DEVICE — WIRE GUIDE AMPLATZ SUPER STIFF 0.035"X180CM 46-501

## (undated) DEVICE — SU VICRYL 3-0 SH 27" J316H

## (undated) DEVICE — TUBE JEJUNOSTOMY 16FR 0200-16

## (undated) DEVICE — DECANTER VIAL 2006S

## (undated) DEVICE — INTR PEELAWAY 20FRX20CM G06445 PLVW-20.0-38

## (undated) DEVICE — GUIDEWIRE AMPLATZ SUPER STIFF 0.035"X180CM M001465250

## (undated) DEVICE — ENDO TROCAR OPTICAL ACCESS KII Z-THRD 12X60MM C0R83

## (undated) DEVICE — ESU PENCIL W/COATED BLADE E2450H

## (undated) DEVICE — TUBING SUCTION 10'X3/16" N510

## (undated) DEVICE — SU SILK 0 SH 30" K834H

## (undated) DEVICE — SOL NACL 0.9% IRRIG 1000ML BOTTLE 2F7124

## (undated) DEVICE — SU SILK 3-0 SH 30" K832H

## (undated) DEVICE — DRSG DRAIN 2X2" 7087

## (undated) DEVICE — CATH PLUG W/CAP 000076

## (undated) DEVICE — DRSG STERI STRIP 1/2X4" R1547

## (undated) DEVICE — KIT ENDO FIRST STEP DISINFECTANT 200ML W/POUCH EP-4

## (undated) DEVICE — Device

## (undated) DEVICE — DRAPE LAP W/ARMBOARD 29410

## (undated) DEVICE — SU VICRYL 4-0 PS-2 18" UND J496H

## (undated) DEVICE — LINEN GOWN XLG 5407

## (undated) DEVICE — BLADE CLIPPER SGL USE 9680

## (undated) DEVICE — PREP CHLORAPREP ORANGE 3ML  260415

## (undated) DEVICE — DRAPE SHEET MED 44X70" 9355

## (undated) DEVICE — CATH VA INTR 14FRX14CM KIT LATEX 612613

## (undated) DEVICE — SYR 10ML SLIP TIP W/O NDL 303134

## (undated) DEVICE — ENDO TROCAR SLEEVE KII Z-THREADED 12X100MM CTS22

## (undated) DEVICE — ENDO SYSTEM WATER BOTTLE & TUBING W/CO2 FILTER 00711549

## (undated) DEVICE — DRAPE IOBAN INCISE 23X17" 6650EZ

## (undated) DEVICE — KIT CONNECTOR FOR OLYMPUS ENDOSCOPES DEFENDO 100310

## (undated) DEVICE — SU VICRYL 0 UR-6 27" J603H

## (undated) DEVICE — ESU ELEC BLADE 2.75" COATED/INSULATED E1455

## (undated) DEVICE — PREP CHLORAPREP 26ML TINTED ORANGE  260815

## (undated) DEVICE — LINEN TOWEL PACK X5 5464

## (undated) DEVICE — GOWN XLG DISP 9545

## (undated) RX ORDER — FENTANYL CITRATE 50 UG/ML
INJECTION, SOLUTION INTRAMUSCULAR; INTRAVENOUS
Status: DISPENSED
Start: 2019-02-18

## (undated) RX ORDER — BUPIVACAINE HYDROCHLORIDE 2.5 MG/ML
INJECTION, SOLUTION EPIDURAL; INFILTRATION; INTRACAUDAL
Status: DISPENSED
Start: 2019-02-18

## (undated) RX ORDER — CEFAZOLIN SODIUM 2 G/100ML
INJECTION, SOLUTION INTRAVENOUS
Status: DISPENSED
Start: 2019-02-18

## (undated) RX ORDER — BUPIVACAINE HYDROCHLORIDE AND EPINEPHRINE 5; 5 MG/ML; UG/ML
INJECTION, SOLUTION EPIDURAL; INTRACAUDAL; PERINEURAL
Status: DISPENSED
Start: 2019-02-18